# Patient Record
Sex: FEMALE | Race: BLACK OR AFRICAN AMERICAN | NOT HISPANIC OR LATINO
[De-identification: names, ages, dates, MRNs, and addresses within clinical notes are randomized per-mention and may not be internally consistent; named-entity substitution may affect disease eponyms.]

---

## 2022-06-01 PROBLEM — Z00.00 ENCOUNTER FOR PREVENTIVE HEALTH EXAMINATION: Status: ACTIVE | Noted: 2022-06-01

## 2022-06-02 ENCOUNTER — APPOINTMENT (OUTPATIENT)
Dept: SURGICAL ONCOLOGY | Facility: CLINIC | Age: 72
End: 2022-06-02
Payer: MEDICARE

## 2022-06-02 VITALS
OXYGEN SATURATION: 97 % | WEIGHT: 177 LBS | BODY MASS INDEX: 28.45 KG/M2 | HEART RATE: 55 BPM | DIASTOLIC BLOOD PRESSURE: 80 MMHG | TEMPERATURE: 98.5 F | RESPIRATION RATE: 15 BRPM | HEIGHT: 66 IN | SYSTOLIC BLOOD PRESSURE: 166 MMHG

## 2022-06-02 DIAGNOSIS — Z86.79 PERSONAL HISTORY OF OTHER DISEASES OF THE CIRCULATORY SYSTEM: ICD-10-CM

## 2022-06-02 DIAGNOSIS — Z86.39 PERSONAL HISTORY OF OTHER ENDOCRINE, NUTRITIONAL AND METABOLIC DISEASE: ICD-10-CM

## 2022-06-02 PROCEDURE — 99204 OFFICE O/P NEW MOD 45 MIN: CPT

## 2022-06-02 NOTE — HISTORY OF PRESENT ILLNESS
[de-identified] : Arti is a pleasant 72 year-old female here for an initial consultation, referred by Dr. Jaquan Ambrose. \par \par She completed a bilateral screening mammogram on 3/30/22 which revealed suggestion of spiculated masses/architectural distortion in the outer central right breast.  Subcentimeter masses are seen in the lower central and lower inner right breast 4-5 cmfn.  Questionable architectural distortion in the central left breast 6 cmfn seen on CC view.  Diagnostic mammogram/sonogram is recommended as well as biopsy of the spiculated masses/architectural distortion in the outer central right breast (BIRADS 4).\par \par Subsequent bilateral diagnostic mammogram and sonogram was performed on 5/3/22. 4 masses are seen in the right breast at 9:00, 12-14 cmfn are highly suggestive of malignancy.  Ultrasound-guided core biopsy of one of the masses is recommended (BIRADS 5). \par \par Biopsy was performed on 22.  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 Past surgical history of 1 .\par Arti denies any personal or family history of breast cancer, she does have a sister with Ovarian cancer at age 54.\par She reports daily caffeine intake with 1 cup of tea, denies any history of alcohol or tobacco use. \par \par 2022: Today she denies palpable breast masses, nipple discharge, skin changes, inversion or breast pain.\par

## 2022-06-02 NOTE — CONSULT LETTER
[Dear  ___] : Dear  [unfilled], [Consult Letter:] : I had the pleasure of evaluating your patient, [unfilled]. [Please see my note below.] : Please see my note below. [Consult Closing:] : Thank you very much for allowing me to participate in the care of this patient.  If you have any questions, please do not hesitate to contact me. [Sincerely,] : Sincerely, [FreeTextEntry2] : Jaquan Ambrose MD [FreeTextEntry3] : Aldo Sainz MD\par Surgical Oncology\par [facility]\par Office: [phone]\par Cell: [phone]\par

## 2022-06-02 NOTE — PHYSICAL EXAM
[Normal Neck Lymph Nodes] : normal neck lymph nodes  [Normal Supraclavicular Lymph Nodes] : normal supraclavicular lymph nodes [Normal Groin Lymph Nodes] : normal groin lymph nodes [Normal Axillary Lymph Nodes] : normal axillary lymph nodes [Normal] : oriented to person, place and time, with appropriate affect [FreeTextEntry1] : SS present during exam [Normal] : normal breast inspection and palpation of axillas [de-identified] : Nodularity along the right breast 9:00 location, but no discrete palpable mass.

## 2022-06-02 NOTE — ASSESSMENT
[FreeTextEntry1] : We discussed the need for staging breast MRI, and CT scans of the chest, abdomen, and pelvis.  Arti will follow-up with me upon completion of the imaging to discuss treatment options including breast conservation versus mastectomy.  She may also need a medical oncology evaluation pending the final results of her receptor studies.

## 2022-06-03 ENCOUNTER — RESULT REVIEW (OUTPATIENT)
Age: 72
End: 2022-06-03

## 2022-06-06 ENCOUNTER — APPOINTMENT (OUTPATIENT)
Dept: CT IMAGING | Facility: IMAGING CENTER | Age: 72
End: 2022-06-06
Payer: MEDICARE

## 2022-06-06 ENCOUNTER — OUTPATIENT (OUTPATIENT)
Dept: OUTPATIENT SERVICES | Facility: HOSPITAL | Age: 72
LOS: 1 days | End: 2022-06-06
Payer: COMMERCIAL

## 2022-06-06 DIAGNOSIS — C50.911 MALIGNANT NEOPLASM OF UNSPECIFIED SITE OF RIGHT FEMALE BREAST: ICD-10-CM

## 2022-06-06 PROCEDURE — 74177 CT ABD & PELVIS W/CONTRAST: CPT

## 2022-06-06 PROCEDURE — 71260 CT THORAX DX C+: CPT | Mod: 26

## 2022-06-06 PROCEDURE — 71260 CT THORAX DX C+: CPT

## 2022-06-06 PROCEDURE — 74177 CT ABD & PELVIS W/CONTRAST: CPT | Mod: 26

## 2022-06-07 ENCOUNTER — APPOINTMENT (OUTPATIENT)
Dept: MRI IMAGING | Facility: CLINIC | Age: 72
End: 2022-06-07
Payer: MEDICARE

## 2022-06-07 PROCEDURE — 77049 MRI BREAST C-+ W/CAD BI: CPT

## 2022-06-07 PROCEDURE — A9585: CPT

## 2022-06-08 ENCOUNTER — OUTPATIENT (OUTPATIENT)
Dept: OUTPATIENT SERVICES | Facility: HOSPITAL | Age: 72
LOS: 1 days | End: 2022-06-08
Payer: COMMERCIAL

## 2022-06-08 ENCOUNTER — RESULT REVIEW (OUTPATIENT)
Age: 72
End: 2022-06-08

## 2022-06-08 DIAGNOSIS — C50.911 MALIGNANT NEOPLASM OF UNSPECIFIED SITE OF RIGHT FEMALE BREAST: ICD-10-CM

## 2022-06-08 LAB — SURGICAL PATHOLOGY STUDY: SIGNIFICANT CHANGE UP

## 2022-06-08 PROCEDURE — 88321 CONSLTJ&REPRT SLD PREP ELSWR: CPT

## 2022-06-13 ENCOUNTER — NON-APPOINTMENT (OUTPATIENT)
Age: 72
End: 2022-06-13

## 2022-06-16 ENCOUNTER — NON-APPOINTMENT (OUTPATIENT)
Age: 72
End: 2022-06-16

## 2022-06-16 ENCOUNTER — APPOINTMENT (OUTPATIENT)
Dept: SURGICAL ONCOLOGY | Facility: CLINIC | Age: 72
End: 2022-06-16
Payer: MEDICARE

## 2022-06-16 VITALS
RESPIRATION RATE: 16 BRPM | HEART RATE: 53 BPM | DIASTOLIC BLOOD PRESSURE: 73 MMHG | HEIGHT: 66 IN | SYSTOLIC BLOOD PRESSURE: 156 MMHG | OXYGEN SATURATION: 99 % | TEMPERATURE: 97.2 F | WEIGHT: 179 LBS | BODY MASS INDEX: 28.77 KG/M2

## 2022-06-16 PROCEDURE — 99214 OFFICE O/P EST MOD 30 MIN: CPT

## 2022-06-16 NOTE — PHYSICAL EXAM
[Normal] : normal breast inspection and palpation of axillas [Normal Neck Lymph Nodes] : normal neck lymph nodes  [Normal Supraclavicular Lymph Nodes] : normal supraclavicular lymph nodes [Normal Groin Lymph Nodes] : normal groin lymph nodes [Normal Axillary Lymph Nodes] : normal axillary lymph nodes [Normal] : oriented to person, place and time, with appropriate affect [FreeTextEntry1] : SS present during exam [de-identified] : Nodularity along the right breast 9:00 location, but no discrete palpable mass.

## 2022-06-16 NOTE — CONSULT LETTER
[Dear  ___] : Dear  [unfilled], [Consult Letter:] : I had the pleasure of evaluating your patient, [unfilled]. [Please see my note below.] : Please see my note below. [Consult Closing:] : Thank you very much for allowing me to participate in the care of this patient.  If you have any questions, please do not hesitate to contact me. [Sincerely,] : Sincerely, [FreeTextEntry2] : Jaquan Ambrose MD [FreeTextEntry3] : Aldo Sainz MD\par Surgical Oncology\par [facility]\par Office: [phone]\par Cell: [phone]\par

## 2022-06-16 NOTE — HISTORY OF PRESENT ILLNESS
[de-identified] : Arti is a pleasant 72 year-old female here for a follow-up, referred by Dr. Jaquan Ambrose. \par \par She completed a bilateral screening mammogram on 3/30/22 which revealed suggestion of spiculated masses/architectural distortion in the outer central right breast.  Subcentimeter masses are seen in the lower central and lower inner right breast 4-5 cmfn.  Questionable architectural distortion in the central left breast 6 cmfn seen on CC view.  Diagnostic mammogram/sonogram is recommended as well as biopsy of the spiculated masses/architectural distortion in the outer central right breast (BIRADS 4).\par \par Subsequent bilateral diagnostic mammogram and sonogram was performed on 5/3/22. 4 masses are seen in the right breast at 9:00, 12-14 cmfn are highly suggestive of malignancy.  Ultrasound-guided core biopsy of one of the masses is recommended (BIRADS 5). \par \par Biopsy was performed on 22.  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 Past surgical history of 1 .\par Arti denies any personal or family history of breast cancer, she does have a sister with Ovarian cancer at age 54.\par She reports daily caffeine intake with 1 cup of tea, denies any history of alcohol or tobacco use. \par \par 2022: Today she denies palpable breast masses, nipple discharge, skin changes, inversion or breast pain. We discussed the need for staging breast MRI, and CT scans of the chest, abdomen, and pelvis.  Arti will follow-up with me upon completion of the imaging to discuss treatment options including breast conservation versus mastectomy.  She may also need a medical oncology evaluation pending the final results of her receptor studies.\par \par [facility] pathology of biopsy slides: moderately differentiated invasive lobular carcinoma, (ER-/TX-/HER2-)\par \par CT C/A/P from 2022: Multiple small retroperitoneal lymph nodes, the largest of which are considered mildly enlarged. This is nonspecific and may be chronic or reactive. Metastatic disease from breast carcinoma would be considered much less likely although clinical correlation is recommended. PET/CT may be of value if clinically warranted.\par \par Bilateral MR of breasts on 2022: Conglomerate grouping of small irregular masses in the posterior lower outer right breast spanning 3.3 cm, corresponding to a site of biopsy-proven malignancy with contiguous enhancing foci and branch nonmass enhancement extending an additional 5.5 cm anterior to the conglomerate masses, concerning for additional disease. If breast conservation is considered, MRI guided biopsy of a more anterior aspect of this nonmass enhancement is suggested.\par \par 0.7 cm ovoid enhancing mass in the lower outer right breast anterior depth, approximately 3.5 cm posterior to the nipple which is indeterminate. If breast conservation is considered, MRI guided biopsy is recommended.\par No suspicious enhancing findings in the left breast.\par No lymphadenopathy.\par \par 2022: Spoke with Arti on the phone regarding her MRI & CT results, will discuss further in office. She will also need [facility]. \par \par 2022: Arti returns today to discuss her recent imaging in details and discuss next steps.

## 2022-06-16 NOTE — ASSESSMENT
[FreeTextEntry1] : We reviewed Arti's MRI and CT findings.  We also had additional discussions regarding breast conservation and mastectomy.  Arti is still undecided about breast conservation.  I will arrange for a MRI guided biopsy of the right breast.  She will also undergo a pelvic ultrasound and a PET/CT.  We also discussed the need for medical oncology evaluation of the potential for neoadjuvant systemic therapy.

## 2022-06-20 ENCOUNTER — OUTPATIENT (OUTPATIENT)
Dept: OUTPATIENT SERVICES | Facility: HOSPITAL | Age: 72
LOS: 1 days | Discharge: HOME | End: 2022-06-20

## 2022-06-20 DIAGNOSIS — C50.919 MALIGNANT NEOPLASM OF UNSPECIFIED SITE OF UNSPECIFIED FEMALE BREAST: ICD-10-CM

## 2022-06-21 ENCOUNTER — NON-APPOINTMENT (OUTPATIENT)
Age: 72
End: 2022-06-21

## 2022-06-22 ENCOUNTER — RESULT REVIEW (OUTPATIENT)
Age: 72
End: 2022-06-22

## 2022-06-22 ENCOUNTER — OUTPATIENT (OUTPATIENT)
Dept: OUTPATIENT SERVICES | Facility: HOSPITAL | Age: 72
LOS: 1 days | End: 2022-06-22
Payer: COMMERCIAL

## 2022-06-22 ENCOUNTER — APPOINTMENT (OUTPATIENT)
Dept: MRI IMAGING | Facility: IMAGING CENTER | Age: 72
End: 2022-06-22
Payer: MEDICARE

## 2022-06-22 DIAGNOSIS — Z00.8 ENCOUNTER FOR OTHER GENERAL EXAMINATION: ICD-10-CM

## 2022-06-22 PROCEDURE — 19086 BX BREAST ADD LESION MR IMAG: CPT | Mod: RT

## 2022-06-22 PROCEDURE — 19085 BX BREAST 1ST LESION MR IMAG: CPT | Mod: RT

## 2022-06-22 PROCEDURE — 88341 IMHCHEM/IMCYTCHM EA ADD ANTB: CPT | Mod: 26

## 2022-06-22 PROCEDURE — 88342 IMHCHEM/IMCYTCHM 1ST ANTB: CPT

## 2022-06-22 PROCEDURE — 88377 M/PHMTRC ALYS ISHQUANT/SEMIQ: CPT

## 2022-06-22 PROCEDURE — 88360 TUMOR IMMUNOHISTOCHEM/MANUAL: CPT

## 2022-06-22 PROCEDURE — A4648: CPT

## 2022-06-22 PROCEDURE — 19085 BX BREAST 1ST LESION MR IMAG: CPT

## 2022-06-22 PROCEDURE — 19086 BX BREAST ADD LESION MR IMAG: CPT

## 2022-06-22 PROCEDURE — 88305 TISSUE EXAM BY PATHOLOGIST: CPT | Mod: 26

## 2022-06-22 PROCEDURE — 88341 IMHCHEM/IMCYTCHM EA ADD ANTB: CPT

## 2022-06-22 PROCEDURE — 88377 M/PHMTRC ALYS ISHQUANT/SEMIQ: CPT | Mod: 26

## 2022-06-22 PROCEDURE — 77065 DX MAMMO INCL CAD UNI: CPT | Mod: 26,RT

## 2022-06-22 PROCEDURE — A9585: CPT

## 2022-06-22 PROCEDURE — 88342 IMHCHEM/IMCYTCHM 1ST ANTB: CPT | Mod: 26

## 2022-06-22 PROCEDURE — 88360 TUMOR IMMUNOHISTOCHEM/MANUAL: CPT | Mod: 26,59

## 2022-06-22 PROCEDURE — 88305 TISSUE EXAM BY PATHOLOGIST: CPT

## 2022-06-22 PROCEDURE — 77065 DX MAMMO INCL CAD UNI: CPT

## 2022-06-23 ENCOUNTER — APPOINTMENT (OUTPATIENT)
Age: 72
End: 2022-06-23
Payer: MEDICARE

## 2022-06-23 ENCOUNTER — RESULT REVIEW (OUTPATIENT)
Age: 72
End: 2022-06-23

## 2022-06-23 DIAGNOSIS — Z78.9 OTHER SPECIFIED HEALTH STATUS: ICD-10-CM

## 2022-06-23 DIAGNOSIS — Z80.41 FAMILY HISTORY OF MALIGNANT NEOPLASM OF OVARY: ICD-10-CM

## 2022-06-23 LAB
APTT BLD: 31.1 SEC
BASOPHILS # BLD AUTO: 0.02 K/UL — SIGNIFICANT CHANGE UP (ref 0–0.2)
BASOPHILS NFR BLD AUTO: 0.4 % — SIGNIFICANT CHANGE UP (ref 0–2)
EOSINOPHIL # BLD AUTO: 0.06 K/UL — SIGNIFICANT CHANGE UP (ref 0–0.5)
EOSINOPHIL NFR BLD AUTO: 1.3 % — SIGNIFICANT CHANGE UP (ref 0–6)
HBV CORE IGG+IGM SER QL: NONREACTIVE
HBV SURFACE AB SER QL: NONREACTIVE
HBV SURFACE AG SER QL: NONREACTIVE
HCT VFR BLD CALC: 35.7 % — SIGNIFICANT CHANGE UP (ref 34.5–45)
HGB BLD-MCNC: 11.4 G/DL — LOW (ref 11.5–15.5)
IMM GRANULOCYTES NFR BLD AUTO: 0.4 % — SIGNIFICANT CHANGE UP (ref 0–1.5)
INR PPP: 1.11 RATIO
LYMPHOCYTES # BLD AUTO: 1.73 K/UL — SIGNIFICANT CHANGE UP (ref 1–3.3)
LYMPHOCYTES # BLD AUTO: 37.6 % — SIGNIFICANT CHANGE UP (ref 13–44)
MCHC RBC-ENTMCNC: 30.5 PG — SIGNIFICANT CHANGE UP (ref 27–34)
MCHC RBC-ENTMCNC: 31.9 G/DL — LOW (ref 32–36)
MCV RBC AUTO: 95.5 FL — SIGNIFICANT CHANGE UP (ref 80–100)
MONOCYTES # BLD AUTO: 0.54 K/UL — SIGNIFICANT CHANGE UP (ref 0–0.9)
MONOCYTES NFR BLD AUTO: 11.7 % — SIGNIFICANT CHANGE UP (ref 2–14)
NEUTROPHILS # BLD AUTO: 2.23 K/UL — SIGNIFICANT CHANGE UP (ref 1.8–7.4)
NEUTROPHILS NFR BLD AUTO: 48.6 % — SIGNIFICANT CHANGE UP (ref 43–77)
NRBC # BLD: 0 /100 WBCS — SIGNIFICANT CHANGE UP (ref 0–0)
PLATELET # BLD AUTO: 163 K/UL — SIGNIFICANT CHANGE UP (ref 150–400)
PT BLD: 12.9 SEC
RBC # BLD: 3.74 M/UL — LOW (ref 3.8–5.2)
RBC # FLD: 14.4 % — SIGNIFICANT CHANGE UP (ref 10.3–14.5)
WBC # BLD: 4.6 K/UL — SIGNIFICANT CHANGE UP (ref 3.8–10.5)
WBC # FLD AUTO: 4.6 K/UL — SIGNIFICANT CHANGE UP (ref 3.8–10.5)

## 2022-06-23 PROCEDURE — 99205 OFFICE O/P NEW HI 60 MIN: CPT

## 2022-06-23 RX ORDER — LEVOTHYROXINE SODIUM 0.09 MG/1
88 TABLET ORAL
Qty: 90 | Refills: 0 | Status: ACTIVE | COMMUNITY
Start: 2022-01-21

## 2022-06-23 RX ORDER — FLUTICASONE PROPIONATE 50 UG/1
50 SPRAY, METERED NASAL
Qty: 48 | Refills: 0 | Status: ACTIVE | COMMUNITY
Start: 2022-05-13

## 2022-06-23 RX ORDER — OLMESARTAN MEDOXOMIL 20 MG/1
20 TABLET, FILM COATED ORAL
Qty: 90 | Refills: 0 | Status: ACTIVE | COMMUNITY
Start: 2022-01-21

## 2022-06-24 LAB — CANCER AG27-29 SERPL-ACNC: 11.8 U/ML

## 2022-06-25 LAB
25(OH)D3 SERPL-MCNC: 40.6 NG/ML
ALBUMIN SERPL ELPH-MCNC: 4.5 G/DL
ALP BLD-CCNC: 87 U/L
ALT SERPL-CCNC: 14 U/L
ANION GAP SERPL CALC-SCNC: 21 MMOL/L
AST SERPL-CCNC: 21 U/L
BILIRUB SERPL-MCNC: 0.4 MG/DL
BUN SERPL-MCNC: 20 MG/DL
CALCIUM SERPL-MCNC: 10.2 MG/DL
CHLORIDE SERPL-SCNC: 102 MMOL/L
CO2 SERPL-SCNC: 18 MMOL/L
CREAT SERPL-MCNC: 1.54 MG/DL
EGFR: 35 ML/MIN/1.73M2
GLUCOSE SERPL-MCNC: 92 MG/DL
POTASSIUM SERPL-SCNC: 4.4 MMOL/L
PROT SERPL-MCNC: 8.5 G/DL
SODIUM SERPL-SCNC: 141 MMOL/L
TSH SERPL-ACNC: 6.01 UIU/ML

## 2022-07-02 ENCOUNTER — OUTPATIENT (OUTPATIENT)
Dept: OUTPATIENT SERVICES | Facility: HOSPITAL | Age: 72
LOS: 1 days | End: 2022-07-02
Payer: COMMERCIAL

## 2022-07-02 DIAGNOSIS — Z11.52 ENCOUNTER FOR SCREENING FOR COVID-19: ICD-10-CM

## 2022-07-02 LAB — SARS-COV-2 RNA SPEC QL NAA+PROBE: SIGNIFICANT CHANGE UP

## 2022-07-02 PROCEDURE — U0005: CPT

## 2022-07-02 PROCEDURE — C9803: CPT

## 2022-07-02 PROCEDURE — U0003: CPT

## 2022-07-05 ENCOUNTER — APPOINTMENT (OUTPATIENT)
Dept: NUCLEAR MEDICINE | Facility: IMAGING CENTER | Age: 72
End: 2022-07-05

## 2022-07-05 ENCOUNTER — OUTPATIENT (OUTPATIENT)
Dept: OUTPATIENT SERVICES | Facility: HOSPITAL | Age: 72
LOS: 1 days | End: 2022-07-05
Payer: COMMERCIAL

## 2022-07-05 DIAGNOSIS — C50.911 MALIGNANT NEOPLASM OF UNSPECIFIED SITE OF RIGHT FEMALE BREAST: ICD-10-CM

## 2022-07-05 DIAGNOSIS — Z00.8 ENCOUNTER FOR OTHER GENERAL EXAMINATION: ICD-10-CM

## 2022-07-05 PROCEDURE — A9552: CPT

## 2022-07-05 PROCEDURE — 78815 PET IMAGE W/CT SKULL-THIGH: CPT | Mod: 26,PI

## 2022-07-05 PROCEDURE — 78815 PET IMAGE W/CT SKULL-THIGH: CPT

## 2022-07-07 ENCOUNTER — APPOINTMENT (OUTPATIENT)
Age: 72
End: 2022-07-07

## 2022-07-07 VITALS
HEIGHT: 65.35 IN | OXYGEN SATURATION: 99 % | BODY MASS INDEX: 29.39 KG/M2 | HEART RATE: 86 BPM | SYSTOLIC BLOOD PRESSURE: 165 MMHG | DIASTOLIC BLOOD PRESSURE: 74 MMHG | WEIGHT: 178.57 LBS | TEMPERATURE: 98.1 F | RESPIRATION RATE: 16 BRPM

## 2022-07-07 DIAGNOSIS — I10 ESSENTIAL (PRIMARY) HYPERTENSION: ICD-10-CM

## 2022-07-07 DIAGNOSIS — E03.9 HYPOTHYROIDISM, UNSPECIFIED: ICD-10-CM

## 2022-07-07 PROCEDURE — 93010 ELECTROCARDIOGRAM REPORT: CPT

## 2022-07-07 PROCEDURE — 93000 ELECTROCARDIOGRAM COMPLETE: CPT

## 2022-07-07 PROCEDURE — 99214 OFFICE O/P EST MOD 30 MIN: CPT

## 2022-07-08 PROBLEM — E03.9 HYPOTHYROIDISM: Status: ACTIVE | Noted: 2022-07-08

## 2022-07-08 PROBLEM — I10 HTN (HYPERTENSION): Status: ACTIVE | Noted: 2022-07-08

## 2022-07-11 ENCOUNTER — OUTPATIENT (OUTPATIENT)
Dept: OUTPATIENT SERVICES | Facility: HOSPITAL | Age: 72
LOS: 1 days | End: 2022-07-11
Payer: COMMERCIAL

## 2022-07-11 DIAGNOSIS — Z11.52 ENCOUNTER FOR SCREENING FOR COVID-19: ICD-10-CM

## 2022-07-11 LAB — SARS-COV-2 RNA SPEC QL NAA+PROBE: SIGNIFICANT CHANGE UP

## 2022-07-11 PROCEDURE — C9803: CPT

## 2022-07-11 PROCEDURE — U0003: CPT

## 2022-07-11 PROCEDURE — U0005: CPT

## 2022-07-12 ENCOUNTER — NON-APPOINTMENT (OUTPATIENT)
Age: 72
End: 2022-07-12

## 2022-07-13 ENCOUNTER — NON-APPOINTMENT (OUTPATIENT)
Age: 72
End: 2022-07-13

## 2022-07-14 ENCOUNTER — RESULT REVIEW (OUTPATIENT)
Age: 72
End: 2022-07-14

## 2022-07-14 ENCOUNTER — APPOINTMENT (OUTPATIENT)
Dept: ULTRASOUND IMAGING | Facility: IMAGING CENTER | Age: 72
End: 2022-07-14

## 2022-07-14 ENCOUNTER — OUTPATIENT (OUTPATIENT)
Dept: OUTPATIENT SERVICES | Facility: HOSPITAL | Age: 72
LOS: 1 days | End: 2022-07-14
Payer: COMMERCIAL

## 2022-07-14 DIAGNOSIS — Z00.8 ENCOUNTER FOR OTHER GENERAL EXAMINATION: ICD-10-CM

## 2022-07-14 DIAGNOSIS — C50.911 MALIGNANT NEOPLASM OF UNSPECIFIED SITE OF RIGHT FEMALE BREAST: ICD-10-CM

## 2022-07-14 PROCEDURE — 76856 US EXAM PELVIC COMPLETE: CPT | Mod: 26

## 2022-07-14 PROCEDURE — 76830 TRANSVAGINAL US NON-OB: CPT | Mod: 26

## 2022-07-14 PROCEDURE — 76856 US EXAM PELVIC COMPLETE: CPT

## 2022-07-14 PROCEDURE — 76830 TRANSVAGINAL US NON-OB: CPT

## 2022-07-15 ENCOUNTER — TRANSCRIPTION ENCOUNTER (OUTPATIENT)
Age: 72
End: 2022-07-15

## 2022-07-15 ENCOUNTER — RESULT REVIEW (OUTPATIENT)
Age: 72
End: 2022-07-15

## 2022-07-15 ENCOUNTER — OUTPATIENT (OUTPATIENT)
Dept: OUTPATIENT SERVICES | Facility: HOSPITAL | Age: 72
LOS: 1 days | End: 2022-07-15
Payer: COMMERCIAL

## 2022-07-15 ENCOUNTER — NON-APPOINTMENT (OUTPATIENT)
Age: 72
End: 2022-07-15

## 2022-07-15 VITALS
OXYGEN SATURATION: 99 % | SYSTOLIC BLOOD PRESSURE: 170 MMHG | WEIGHT: 177.91 LBS | RESPIRATION RATE: 20 BRPM | DIASTOLIC BLOOD PRESSURE: 91 MMHG | HEIGHT: 66 IN | HEART RATE: 56 BPM | TEMPERATURE: 98 F

## 2022-07-15 VITALS
HEART RATE: 80 BPM | DIASTOLIC BLOOD PRESSURE: 59 MMHG | RESPIRATION RATE: 21 BRPM | OXYGEN SATURATION: 100 % | SYSTOLIC BLOOD PRESSURE: 110 MMHG

## 2022-07-15 DIAGNOSIS — C50.911 MALIGNANT NEOPLASM OF UNSPECIFIED SITE OF RIGHT FEMALE BREAST: ICD-10-CM

## 2022-07-15 PROCEDURE — 77001 FLUOROGUIDE FOR VEIN DEVICE: CPT | Mod: 26

## 2022-07-15 PROCEDURE — 36561 INSERT TUNNELED CV CATH: CPT | Mod: LT

## 2022-07-15 PROCEDURE — 76937 US GUIDE VASCULAR ACCESS: CPT | Mod: 26

## 2022-07-15 NOTE — ASU DISCHARGE PLAN (ADULT/PEDIATRIC) - ASU DC SPECIAL INSTRUCTIONSFT
Chest Port Placement    Discharge Instructions  - You have had a chest port implanted in your chest.   - The port is ready for use.  - You may shower in 48 hours. No soaking or swimming for 2 weeks or until the site is completely healed.  - Keep the area covered and dry for the next 7 days. It may be removed by a chemotherapy nurse as needed for treatment.  - Do not perform any heavy lifting or put tension on the area for the next week or until the site is healed.  - You may resume your normal diet.  - You may resume your normal medications however you should wait 48 hours before restarting aspirin, Plavix, or blood thinners.  - It is normal to experience some pain over the site for the next few days. You may take apply ice to the area (20 minutes on, 20 minutes off) and take Tylenol for that pain. Do not take more frequently than every 6 hours and do not exceed more than 3000mg of Tylenol in a 24 hour period.    - You were given conscious sedation which may make you drowsy, therefore you need someone to stay with you until the morning following the procedure.  - Do not drive, engage in heavy lifting or strenuous activity, or drink any alcoholic beverages for the next 24 hours.   - You may resume normal activity in 24 hours.    Notify your primary physician and/or Interventional Radiology IMMEDIATELY if you experience any of the following       - Fever of 100.4F or 38C       - Chills or Rigors/ Shakes       - Swelling and/or Redness in the area around the port       - Worsening Pain       - Blood soaked bandages or worsening bleeding       - Lightheadedness and/or dizziness upon standing       - Chest Pain/ Tightness       - Shortness of Breath       - Difficulty walking    If you have a problem that you believe requires IMMEDIATE attention, please go to your NEAREST Emergency Room. If you believe your problem can safely wait until you speak to a physician, please call Interventional Radiology for any concerns.    During Normal Weekday Business Hours- You can contact the Interventional Radiology department during normal business hours via telephone.  During Evenings and Weekends- If you need to contact Interventional Radiology during off hours, do so by calling the hospital and requesting to be connected to the Interventional Radiologist on call.

## 2022-07-15 NOTE — PRE PROCEDURE NOTE - PRE PROCEDURE EVALUATION
Interventional Radiology    HPI: 72y Female with newly diagnosed right side breast cancer requiring venous access for chemotherapy presents to IR for left chest wall port placement.    Allergies: NKDA    Data:    81  T(C): --  HR: --  BP: --  RR: --  SpO2: --    Exam  General: No acute distress  Chest: Non labored breathing  Abdomen: Non-distended  Extremities: No swelling, warm      Plan: 72y Female presents for chest port placement  -Risks/Benefits/alternatives explained with the patient and witnessed informed consent obtained.

## 2022-07-15 NOTE — ASU DISCHARGE PLAN (ADULT/PEDIATRIC) - NS MD DC FALL RISK RISK
For information on Fall & Injury Prevention, visit: [email or web address] OR  [email or web address] OR  https://www.cdc.gov/steadi/patient.html

## 2022-07-18 PROBLEM — E03.9 HYPOTHYROIDISM, UNSPECIFIED: Chronic | Status: ACTIVE | Noted: 2022-07-15

## 2022-07-18 PROBLEM — I10 ESSENTIAL (PRIMARY) HYPERTENSION: Chronic | Status: ACTIVE | Noted: 2022-07-15

## 2022-07-18 PROBLEM — C50.911 MALIGNANT NEOPLASM OF UNSPECIFIED SITE OF RIGHT FEMALE BREAST: Chronic | Status: ACTIVE | Noted: 2022-07-15

## 2022-07-19 ENCOUNTER — OUTPATIENT (OUTPATIENT)
Dept: OUTPATIENT SERVICES | Facility: HOSPITAL | Age: 72
LOS: 1 days | End: 2022-07-19

## 2022-07-19 ENCOUNTER — APPOINTMENT (OUTPATIENT)
Dept: CV DIAGNOSITCS | Facility: HOSPITAL | Age: 72
End: 2022-07-19

## 2022-07-19 DIAGNOSIS — C50.911 MALIGNANT NEOPLASM OF UNSPECIFIED SITE OF RIGHT FEMALE BREAST: ICD-10-CM

## 2022-07-19 PROCEDURE — 93306 TTE W/DOPPLER COMPLETE: CPT | Mod: 26

## 2022-07-19 NOTE — ASSESSMENT
[FreeTextEntry1] : Ms. BALDERRAMA 's questions were answered to her satisfaction. \par She  expressed her  understanding and willingness to comply with the above recommendations, and  will return to the office in .\par \par

## 2022-07-19 NOTE — CONSULT LETTER
[Dear  ___] : Dear ~MI, [Consult Letter:] : I had the pleasure of evaluating your patient, [unfilled]. [Please see my note below.] : Please see my note below. [Consult Closing:] : Thank you very much for allowing me to participate in the care of this patient.  If you have any questions, please do not hesitate to contact me. [Sincerely,] : Sincerely, [DrMagda  ___] : Dr. CARDOSO [FreeTextEntry2] : Aldo Sainz MD [FreeTextEntry3] : VIMAL IBARRA M.D.\par Medical/ Oncology\par [facility] \par [facility]\par [address]\par [place], [place] [address]\par Telephone: [phone]\par Fax: [phone]\par \par \par \par \par \par

## 2022-07-19 NOTE — HISTORY OF PRESENT ILLNESS
[Disease: _____________________] : Disease: [unfilled] [de-identified] : 72F,originally from [place], with PMX HTN, hypothyroidism, s/p , referred for medical oncology consultation of right breast, ER/[place]/HER2 negative mixed poorly differentiated ductal/lobular carcinoma.\par \par CASE SYNOPSIS:\par 3/30/22: Bilateral screening mammogram–spiculated masses/architectural distortion in the outer central right breast.  Subcentimeter masses seen in the lower central and lower inner right breast N4–5 cm.  Questionable architectural distortion in the central left breast, N 6 cm seen on cc view.\par 5/3/2022 diagnostic mammography and breast ultrasound; for masses seen in the right breast at 9:00, N 12-14 cm, highly suggestive of malignancy, for which US guided core biopsy recommended.\par 2022–US core biopsy right breast mass; pathology consistent with invasive, mixed poorly differentiated ductal/lobular carcinoma, ER, [place], HER2 negative.\par 2022: CT C/A/P–multiple, small retroperitoneal lymph nodes, considered mildly enlarged, nonspecific, chronic/reactive, less likely metastatic disease from breast cancer.\par 2022: Bilateral breast MRI: Conglomerate grouping of small irregular masses in the posterior lower outer right breast spanning 3.3 cm corresponding to the site of biopsy-proven malignancy with contiguous enhancing foci and branched non-mass enhancement extending an additional 5.5 cm anterior to the conglomerate masses, concerning for additional disease.  MRI guided biopsy of a more anterior aspect of this none mass enhancement is suggested if breast conservation is considered.\par 0.7 cm ovoid enhancing mass in the lower outer right breast anterior depth, approximately 3.5 cm posterior to the nipple, indeterminate.  MRI guided biopsy recommended\par No suspicious enhancing findings in the left breast.  No lymphadenopathy.\par \par Patient is pending an MRI guided biopsy of the right breast, a pelvic ultrasound and a PET/CT to further identify the stage of disease.  Overall patient denies constitutional symptoms.  Gives a family history of ovarian cancer (sister, diagnosed age 54), but denies history of breast cancers in the family. [de-identified] : Moderately differentiated invasive lobular carcinoma [de-identified] : ER negative, CO negative, HER2 negative [FreeTextEntry1] : Neoadjuvant systemic chemotherapy

## 2022-07-19 NOTE — HISTORY OF PRESENT ILLNESS
[de-identified] : 72F,originally from [place], with PMX HTN, hypothyroidism, s/p , referred for medical oncology consultation of right breast, ER/[place]/HER2 negative mixed poorly differentiated ductal/lobular carcinoma.\par \par CASE SYNOPSIS:\par 3/30/22: Bilateral screening mammogram–spiculated masses/architectural distortion in the outer central right breast.  Subcentimeter masses seen in the lower central and lower inner right breast N4–5 cm.  Questionable architectural distortion in the central left breast, N 6 cm seen on cc view.\par \par 5/3/2022 diagnostic mammography and breast ultrasound; for masses seen in the right breast at 9:00, N 12-14 cm, highly suggestive of malignancy, for which US guided core biopsy recommended.\par \par 2022–US core biopsy right breast mass; pathology consistent with invasive, mixed poorly differentiated ductal/lobular carcinoma, ER, [place], HER2 negative.\par \par 2022: CT C/A/P–multiple, small retroperitoneal lymph nodes, considered mildly enlarged, nonspecific, chronic/reactive, less likely metastatic disease from breast cancer.\par \par 2022: Bilateral breast MRI: Conglomerate grouping of small irregular masses in the posterior lower outer right breast spanning 3.3 cm corresponding to the site of biopsy-proven malignancy with contiguous enhancing foci and branched non-mass enhancement extending an additional 5.5 cm anterior to the conglomerate masses, concerning for additional disease.  MRI guided biopsy of a more anterior aspect of this none mass enhancement is suggested if breast conservation is considered.\par 0.7 cm ovoid enhancing mass in the lower outer right breast anterior depth, approximately 3.5 cm posterior to the nipple, indeterminate.  MRI guided biopsy recommended\par No suspicious enhancing findings in the left breast.  No lymphadenopathy.\par \par 2022–MRI guided vacuum-assisted core needle biopsy; pathology:\par RIGHT\par Site A (outer lower region):\par Invasive lobular carcinoma, classic type, [place] score 6/9, invasive tumor present in several small foci, all measuring < 0.1 cm, LCIS classic type,\par Site B (anterior outer lower region):\par Intraductal papilloma with adjacent foci of columnar cell change, apocrine metaplasia, microcyst formation and simple adenosis.\par \par 2022: PET scan–preliminary report negative\par  [de-identified] : Moderately differentiated invasive lobular carcinoma [de-identified] : ER negative, CT negative, HER2 negative [FreeTextEntry1] : Neoadjuvant systemic chemotherapy [de-identified] : Patient stable clinically.  Had right breast MRI guided vacuum-assisted core needle biopsy on 6/22/2022, confirming invasive lobular carcinoma ( site A) and intraductal papilloma ( site B).  PET scan preliminary results showed no evidence of distant disease.  No changes in constitutional symptoms or medication.  Appetite and weight preserved.  Recent blood work consistent with mild CKD (EGFR 35 mL/min, creatinine 1.54 mg/dL), negative hepatitis profile, normal coagulation parameters, elevated TSH.  Pending [id number] placement on 7/14/2022, together with the pelvic US.\par

## 2022-07-19 NOTE — PHYSICAL EXAM
[de-identified] : normal breast inspection and palpation of axillas . Nodularity along the right breast 9:00 location, but no discrete palpable mass.

## 2022-07-19 NOTE — REASON FOR VISIT
[Initial Consultation] : an initial consultation [FreeTextEntry2] : Triple negative right breast cancer

## 2022-07-19 NOTE — ASSESSMENT
[FreeTextEntry1] : Ms. BALDERRAMA 's questions were answered to her satisfaction. \par She  expressed her  understanding and willingness to comply with the above recommendations, and  will return to the office in 2 weeks.\par \par

## 2022-07-21 ENCOUNTER — RESULT REVIEW (OUTPATIENT)
Age: 72
End: 2022-07-21

## 2022-07-21 ENCOUNTER — APPOINTMENT (OUTPATIENT)
Dept: HEMATOLOGY ONCOLOGY | Facility: CLINIC | Age: 72
End: 2022-07-21

## 2022-07-21 ENCOUNTER — NON-APPOINTMENT (OUTPATIENT)
Age: 72
End: 2022-07-21

## 2022-07-21 ENCOUNTER — LABORATORY RESULT (OUTPATIENT)
Age: 72
End: 2022-07-21

## 2022-07-21 ENCOUNTER — APPOINTMENT (OUTPATIENT)
Age: 72
End: 2022-07-21

## 2022-07-21 LAB
BASOPHILS # BLD AUTO: 0.03 K/UL — SIGNIFICANT CHANGE UP (ref 0–0.2)
BASOPHILS NFR BLD AUTO: 0.6 % — SIGNIFICANT CHANGE UP (ref 0–2)
EOSINOPHIL # BLD AUTO: 0.08 K/UL — SIGNIFICANT CHANGE UP (ref 0–0.5)
EOSINOPHIL NFR BLD AUTO: 1.7 % — SIGNIFICANT CHANGE UP (ref 0–6)
HCT VFR BLD CALC: 32.8 % — LOW (ref 34.5–45)
HGB BLD-MCNC: 11 G/DL — LOW (ref 11.5–15.5)
IMM GRANULOCYTES NFR BLD AUTO: 0.4 % — SIGNIFICANT CHANGE UP (ref 0–1.5)
LYMPHOCYTES # BLD AUTO: 1.56 K/UL — SIGNIFICANT CHANGE UP (ref 1–3.3)
LYMPHOCYTES # BLD AUTO: 32.2 % — SIGNIFICANT CHANGE UP (ref 13–44)
MCHC RBC-ENTMCNC: 30.7 PG — SIGNIFICANT CHANGE UP (ref 27–34)
MCHC RBC-ENTMCNC: 33.5 G/DL — SIGNIFICANT CHANGE UP (ref 32–36)
MCV RBC AUTO: 91.6 FL — SIGNIFICANT CHANGE UP (ref 80–100)
MONOCYTES # BLD AUTO: 0.58 K/UL — SIGNIFICANT CHANGE UP (ref 0–0.9)
MONOCYTES NFR BLD AUTO: 12 % — SIGNIFICANT CHANGE UP (ref 2–14)
NEUTROPHILS # BLD AUTO: 2.57 K/UL — SIGNIFICANT CHANGE UP (ref 1.8–7.4)
NEUTROPHILS NFR BLD AUTO: 53.1 % — SIGNIFICANT CHANGE UP (ref 43–77)
NRBC # BLD: 0 /100 WBCS — SIGNIFICANT CHANGE UP (ref 0–0)
PLATELET # BLD AUTO: 160 K/UL — SIGNIFICANT CHANGE UP (ref 150–400)
RBC # BLD: 3.58 M/UL — LOW (ref 3.8–5.2)
RBC # FLD: 13.4 % — SIGNIFICANT CHANGE UP (ref 10.3–14.5)
WBC # BLD: 4.84 K/UL — SIGNIFICANT CHANGE UP (ref 3.8–10.5)
WBC # FLD AUTO: 4.84 K/UL — SIGNIFICANT CHANGE UP (ref 3.8–10.5)

## 2022-07-22 DIAGNOSIS — C50.911 MALIGNANT NEOPLASM OF UNSPECIFIED SITE OF RIGHT FEMALE BREAST: ICD-10-CM

## 2022-07-22 DIAGNOSIS — R11.2 NAUSEA WITH VOMITING, UNSPECIFIED: ICD-10-CM

## 2022-07-22 DIAGNOSIS — Z51.11 ENCOUNTER FOR ANTINEOPLASTIC CHEMOTHERAPY: ICD-10-CM

## 2022-07-22 DIAGNOSIS — Z45.2 ENCOUNTER FOR ADJUSTMENT AND MANAGEMENT OF VASCULAR ACCESS DEVICE: ICD-10-CM

## 2022-07-22 DIAGNOSIS — Z51.89 ENCOUNTER FOR OTHER SPECIFIED AFTERCARE: ICD-10-CM

## 2022-07-26 ENCOUNTER — NON-APPOINTMENT (OUTPATIENT)
Age: 72
End: 2022-07-26

## 2022-08-11 ENCOUNTER — RESULT REVIEW (OUTPATIENT)
Age: 72
End: 2022-08-11

## 2022-08-11 ENCOUNTER — APPOINTMENT (OUTPATIENT)
Age: 72
End: 2022-08-11

## 2022-08-11 LAB
ALBUMIN SERPL ELPH-MCNC: 4 G/DL — SIGNIFICANT CHANGE UP (ref 3.3–5)
ALP SERPL-CCNC: 69 U/L — SIGNIFICANT CHANGE UP (ref 40–120)
ALT FLD-CCNC: 15 U/L — SIGNIFICANT CHANGE UP (ref 10–45)
ANION GAP SERPL CALC-SCNC: 12 MMOL/L — SIGNIFICANT CHANGE UP (ref 5–17)
AST SERPL-CCNC: 18 U/L — SIGNIFICANT CHANGE UP (ref 10–40)
BASOPHILS # BLD AUTO: 0 K/UL — SIGNIFICANT CHANGE UP (ref 0–0.2)
BASOPHILS NFR BLD AUTO: 0 % — SIGNIFICANT CHANGE UP (ref 0–2)
BILIRUB SERPL-MCNC: 0.2 MG/DL — SIGNIFICANT CHANGE UP (ref 0.2–1.2)
BUN SERPL-MCNC: 15 MG/DL — SIGNIFICANT CHANGE UP (ref 7–23)
CALCIUM SERPL-MCNC: 9.7 MG/DL — SIGNIFICANT CHANGE UP (ref 8.4–10.5)
CHLORIDE SERPL-SCNC: 102 MMOL/L — SIGNIFICANT CHANGE UP (ref 96–108)
CO2 SERPL-SCNC: 23 MMOL/L — SIGNIFICANT CHANGE UP (ref 22–31)
CREAT SERPL-MCNC: 1.33 MG/DL — HIGH (ref 0.5–1.3)
EGFR: 42 ML/MIN/1.73M2 — LOW
EOSINOPHIL # BLD AUTO: 0 K/UL — SIGNIFICANT CHANGE UP (ref 0–0.5)
EOSINOPHIL NFR BLD AUTO: 0 % — SIGNIFICANT CHANGE UP (ref 0–6)
GLUCOSE SERPL-MCNC: 142 MG/DL — HIGH (ref 70–99)
HCT VFR BLD CALC: 28.8 % — LOW (ref 34.5–45)
HGB BLD-MCNC: 9.8 G/DL — LOW (ref 11.5–15.5)
LYMPHOCYTES # BLD AUTO: 1.21 K/UL — SIGNIFICANT CHANGE UP (ref 1–3.3)
LYMPHOCYTES # BLD AUTO: 26 % — SIGNIFICANT CHANGE UP (ref 13–44)
MCHC RBC-ENTMCNC: 31.1 PG — SIGNIFICANT CHANGE UP (ref 27–34)
MCHC RBC-ENTMCNC: 34 G/DL — SIGNIFICANT CHANGE UP (ref 32–36)
MCV RBC AUTO: 91.4 FL — SIGNIFICANT CHANGE UP (ref 80–100)
MONOCYTES # BLD AUTO: 0.65 K/UL — SIGNIFICANT CHANGE UP (ref 0–0.9)
MONOCYTES NFR BLD AUTO: 14 % — SIGNIFICANT CHANGE UP (ref 2–14)
NEUTROPHILS # BLD AUTO: 2.78 K/UL — SIGNIFICANT CHANGE UP (ref 1.8–7.4)
NEUTROPHILS NFR BLD AUTO: 60 % — SIGNIFICANT CHANGE UP (ref 43–77)
NRBC # BLD: 0 /100 — SIGNIFICANT CHANGE UP (ref 0–0)
NRBC # BLD: SIGNIFICANT CHANGE UP /100 WBCS (ref 0–0)
PLAT MORPH BLD: NORMAL — SIGNIFICANT CHANGE UP
PLATELET # BLD AUTO: 334 K/UL — SIGNIFICANT CHANGE UP (ref 150–400)
POTASSIUM SERPL-MCNC: 3.9 MMOL/L — SIGNIFICANT CHANGE UP (ref 3.5–5.3)
POTASSIUM SERPL-SCNC: 3.9 MMOL/L — SIGNIFICANT CHANGE UP (ref 3.5–5.3)
PROT SERPL-MCNC: 7 G/DL — SIGNIFICANT CHANGE UP (ref 6–8.3)
RBC # BLD: 3.15 M/UL — LOW (ref 3.8–5.2)
RBC # FLD: 13.3 % — SIGNIFICANT CHANGE UP (ref 10.3–14.5)
RBC BLD AUTO: SIGNIFICANT CHANGE UP
SODIUM SERPL-SCNC: 137 MMOL/L — SIGNIFICANT CHANGE UP (ref 135–145)
WBC # BLD: 4.64 K/UL — SIGNIFICANT CHANGE UP (ref 3.8–10.5)
WBC # FLD AUTO: 4.64 K/UL — SIGNIFICANT CHANGE UP (ref 3.8–10.5)

## 2022-08-17 ENCOUNTER — NON-APPOINTMENT (OUTPATIENT)
Age: 72
End: 2022-08-17

## 2022-08-18 ENCOUNTER — APPOINTMENT (OUTPATIENT)
Age: 72
End: 2022-08-18

## 2022-08-24 ENCOUNTER — NON-APPOINTMENT (OUTPATIENT)
Age: 72
End: 2022-08-24

## 2022-08-30 ENCOUNTER — OUTPATIENT (OUTPATIENT)
Dept: OUTPATIENT SERVICES | Facility: HOSPITAL | Age: 72
LOS: 1 days | Discharge: HOME | End: 2022-08-30

## 2022-08-30 DIAGNOSIS — C50.919 MALIGNANT NEOPLASM OF UNSPECIFIED SITE OF UNSPECIFIED FEMALE BREAST: ICD-10-CM

## 2022-08-31 ENCOUNTER — NON-APPOINTMENT (OUTPATIENT)
Age: 72
End: 2022-08-31

## 2022-09-13 ENCOUNTER — RESULT REVIEW (OUTPATIENT)
Age: 72
End: 2022-09-13

## 2022-09-13 ENCOUNTER — APPOINTMENT (OUTPATIENT)
Age: 72
End: 2022-09-13

## 2022-09-13 DIAGNOSIS — R11.2 NAUSEA WITH VOMITING, UNSPECIFIED: ICD-10-CM

## 2022-09-13 DIAGNOSIS — Z51.89 ENCOUNTER FOR OTHER SPECIFIED AFTERCARE: ICD-10-CM

## 2022-09-13 DIAGNOSIS — Z51.11 ENCOUNTER FOR ANTINEOPLASTIC CHEMOTHERAPY: ICD-10-CM

## 2022-09-13 LAB
ALBUMIN SERPL ELPH-MCNC: 4 G/DL — SIGNIFICANT CHANGE UP (ref 3.3–5)
ALP SERPL-CCNC: 62 U/L — SIGNIFICANT CHANGE UP (ref 40–120)
ALT FLD-CCNC: 18 U/L — SIGNIFICANT CHANGE UP (ref 10–45)
ANION GAP SERPL CALC-SCNC: 13 MMOL/L — SIGNIFICANT CHANGE UP (ref 5–17)
AST SERPL-CCNC: 25 U/L — SIGNIFICANT CHANGE UP (ref 10–40)
BASOPHILS # BLD AUTO: 0.04 K/UL — SIGNIFICANT CHANGE UP (ref 0–0.2)
BASOPHILS NFR BLD AUTO: 0.7 % — SIGNIFICANT CHANGE UP (ref 0–2)
BILIRUB SERPL-MCNC: 0.4 MG/DL — SIGNIFICANT CHANGE UP (ref 0.2–1.2)
BUN SERPL-MCNC: 15 MG/DL — SIGNIFICANT CHANGE UP (ref 7–23)
CALCIUM SERPL-MCNC: 9.7 MG/DL — SIGNIFICANT CHANGE UP (ref 8.4–10.5)
CHLORIDE SERPL-SCNC: 103 MMOL/L — SIGNIFICANT CHANGE UP (ref 96–108)
CO2 SERPL-SCNC: 24 MMOL/L — SIGNIFICANT CHANGE UP (ref 22–31)
CREAT SERPL-MCNC: 1.35 MG/DL — HIGH (ref 0.5–1.3)
EGFR: 41 ML/MIN/1.73M2 — LOW
EOSINOPHIL # BLD AUTO: 0.05 K/UL — SIGNIFICANT CHANGE UP (ref 0–0.5)
EOSINOPHIL NFR BLD AUTO: 0.9 % — SIGNIFICANT CHANGE UP (ref 0–6)
GLUCOSE SERPL-MCNC: 93 MG/DL — SIGNIFICANT CHANGE UP (ref 70–99)
HCT VFR BLD CALC: 28.8 % — LOW (ref 34.5–45)
HGB BLD-MCNC: 9.7 G/DL — LOW (ref 11.5–15.5)
IMM GRANULOCYTES NFR BLD AUTO: 0.3 % — SIGNIFICANT CHANGE UP (ref 0–1.5)
LYMPHOCYTES # BLD AUTO: 1.86 K/UL — SIGNIFICANT CHANGE UP (ref 1–3.3)
LYMPHOCYTES # BLD AUTO: 31.9 % — SIGNIFICANT CHANGE UP (ref 13–44)
MCHC RBC-ENTMCNC: 31.1 PG — SIGNIFICANT CHANGE UP (ref 27–34)
MCHC RBC-ENTMCNC: 33.7 G/DL — SIGNIFICANT CHANGE UP (ref 32–36)
MCV RBC AUTO: 92.3 FL — SIGNIFICANT CHANGE UP (ref 80–100)
MONOCYTES # BLD AUTO: 1.42 K/UL — HIGH (ref 0–0.9)
MONOCYTES NFR BLD AUTO: 24.4 % — HIGH (ref 2–14)
NEUTROPHILS # BLD AUTO: 2.44 K/UL — SIGNIFICANT CHANGE UP (ref 1.8–7.4)
NEUTROPHILS NFR BLD AUTO: 41.8 % — LOW (ref 43–77)
NRBC # BLD: 0 /100 WBCS — SIGNIFICANT CHANGE UP (ref 0–0)
PLATELET # BLD AUTO: 197 K/UL — SIGNIFICANT CHANGE UP (ref 150–400)
POTASSIUM SERPL-MCNC: 4.1 MMOL/L — SIGNIFICANT CHANGE UP (ref 3.5–5.3)
POTASSIUM SERPL-SCNC: 4.1 MMOL/L — SIGNIFICANT CHANGE UP (ref 3.5–5.3)
PROT SERPL-MCNC: 7 G/DL — SIGNIFICANT CHANGE UP (ref 6–8.3)
RBC # BLD: 3.12 M/UL — LOW (ref 3.8–5.2)
RBC # FLD: 15.3 % — HIGH (ref 10.3–14.5)
SODIUM SERPL-SCNC: 140 MMOL/L — SIGNIFICANT CHANGE UP (ref 135–145)
WBC # BLD: 5.83 K/UL — SIGNIFICANT CHANGE UP (ref 3.8–10.5)
WBC # FLD AUTO: 5.83 K/UL — SIGNIFICANT CHANGE UP (ref 3.8–10.5)

## 2022-09-14 LAB — TSH SERPL-MCNC: 2.38 UIU/ML — SIGNIFICANT CHANGE UP (ref 0.27–4.2)

## 2022-09-15 ENCOUNTER — NON-APPOINTMENT (OUTPATIENT)
Age: 72
End: 2022-09-15

## 2022-10-06 ENCOUNTER — APPOINTMENT (OUTPATIENT)
Age: 72
End: 2022-10-06

## 2022-10-06 ENCOUNTER — RESULT REVIEW (OUTPATIENT)
Age: 72
End: 2022-10-06

## 2022-10-06 LAB
BASOPHILS # BLD AUTO: 0.03 K/UL — SIGNIFICANT CHANGE UP (ref 0–0.2)
BASOPHILS NFR BLD AUTO: 0.7 % — SIGNIFICANT CHANGE UP (ref 0–2)
EOSINOPHIL # BLD AUTO: 0.02 K/UL — SIGNIFICANT CHANGE UP (ref 0–0.5)
EOSINOPHIL NFR BLD AUTO: 0.5 % — SIGNIFICANT CHANGE UP (ref 0–6)
HCT VFR BLD CALC: 23.4 % — LOW (ref 34.5–45)
HGB BLD-MCNC: 7.9 G/DL — LOW (ref 11.5–15.5)
IMM GRANULOCYTES NFR BLD AUTO: 0.9 % — SIGNIFICANT CHANGE UP (ref 0–0.9)
LYMPHOCYTES # BLD AUTO: 0.54 K/UL — LOW (ref 1–3.3)
LYMPHOCYTES # BLD AUTO: 12.7 % — LOW (ref 13–44)
MCHC RBC-ENTMCNC: 31.7 PG — SIGNIFICANT CHANGE UP (ref 27–34)
MCHC RBC-ENTMCNC: 33.8 G/DL — SIGNIFICANT CHANGE UP (ref 32–36)
MCV RBC AUTO: 94 FL — SIGNIFICANT CHANGE UP (ref 80–100)
MONOCYTES # BLD AUTO: 1.26 K/UL — HIGH (ref 0–0.9)
MONOCYTES NFR BLD AUTO: 29.7 % — HIGH (ref 2–14)
NEUTROPHILS # BLD AUTO: 2.35 K/UL — SIGNIFICANT CHANGE UP (ref 1.8–7.4)
NEUTROPHILS NFR BLD AUTO: 55.5 % — SIGNIFICANT CHANGE UP (ref 43–77)
NRBC # BLD: 0 /100 WBCS — SIGNIFICANT CHANGE UP (ref 0–0)
PLATELET # BLD AUTO: 276 K/UL — SIGNIFICANT CHANGE UP (ref 150–400)
RBC # BLD: 2.49 M/UL — LOW (ref 3.8–5.2)
RBC # FLD: 16.7 % — HIGH (ref 10.3–14.5)
WBC # BLD: 4.24 K/UL — SIGNIFICANT CHANGE UP (ref 3.8–10.5)
WBC # FLD AUTO: 4.24 K/UL — SIGNIFICANT CHANGE UP (ref 3.8–10.5)

## 2022-10-06 PROCEDURE — 99214 OFFICE O/P EST MOD 30 MIN: CPT

## 2022-10-06 RX ORDER — CLOTRIMAZOLE 10 MG/1
10 LOZENGE ORAL DAILY
Qty: 50 | Refills: 0 | Status: ACTIVE | COMMUNITY
Start: 2022-10-06 | End: 1900-01-01

## 2022-10-07 NOTE — ASSESSMENT
[FreeTextEntry1] : Ms. BALDERRAMA 's questions were answered to her satisfaction. \par She  expressed her  understanding and willingness to comply with the above recommendations, and  will return to the office in 3 weeks.\par \par

## 2022-10-07 NOTE — HISTORY OF PRESENT ILLNESS
[Disease: _____________________] : Disease: [unfilled] [de-identified] : 72F,originally from [place], with PMX HTN, hypothyroidism, s/p , referred for medical oncology consultation of right breast, ER/LA/HER2 negative mixed poorly differentiated ductal/lobular carcinoma.\par \par CASE SYNOPSIS:\par 3/30/22: Bilateral screening mammogram–spiculated masses/architectural distortion in the outer central right breast.  Subcentimeter masses seen in the lower central and lower inner right breast N4–5 cm.  Questionable architectural distortion in the central left breast, N 6 cm seen on cc view.\par \par 5/3/2022 diagnostic mammography and breast ultrasound; for masses seen in the right breast at 9:00, N 12-14 cm, highly suggestive of malignancy, for which US guided core biopsy recommended.\par \par 2022–US core biopsy right breast mass; pathology consistent with invasive, mixed poorly differentiated ductal/lobular carcinoma, ER, [place], HER2 negative.\par \par 2022: CT C/A/P–multiple, small retroperitoneal lymph nodes, considered mildly enlarged, nonspecific, chronic/reactive, less likely metastatic disease from breast cancer.\par \par 2022: Bilateral breast MRI: Conglomerate grouping of small irregular masses in the posterior lower outer right breast spanning 3.3 cm corresponding to the site of biopsy-proven malignancy with contiguous enhancing foci and branched non-mass enhancement extending an additional 5.5 cm anterior to the conglomerate masses, concerning for additional disease.  MRI guided biopsy of a more anterior aspect of this none mass enhancement is suggested if breast conservation is considered.\par 0.7 cm ovoid enhancing mass in the lower outer right breast anterior depth, approximately 3.5 cm posterior to the nipple, indeterminate.  MRI guided biopsy recommended\par No suspicious enhancing findings in the left breast.  No lymphadenopathy.\par \par 2022–MRI guided vacuum-assisted core needle biopsy; pathology:\par RIGHT\par Site A (outer lower region):\par Invasive lobular carcinoma, classic type, [place] score 6/9, invasive tumor present in several small foci, all measuring < 0.1 cm, LCIS classic type,\par Site B (anterior outer lower region):\par Intraductal papilloma with adjacent foci of columnar cell change, apocrine metaplasia, microcyst formation and simple adenosis.\par \par 2022: PET scan–preliminary report negative\par \par 7/15/22–[id number]-[id number] placement left chest wall\par \par 22- cycle #1 AC/ pembrolizumab\par  [de-identified] : Moderately differentiated invasive lobular carcinoma [de-identified] : ER negative, UT negative, HER2 negative [FreeTextEntry1] : Neoadjuvant systemic chemotherapy [de-identified] : Here for cycle #4 AC; hemoglobin at 7.4 g/dL and patient complaining of fatigue.  Denies chest pain or palpitations.\par Since her last visit and despite forcing herself to eat, she has lost approximately 11 pounds; followed up with nutrition.\par Tested positive for COVID infection at the end of August; self quarantine until 9/5/2022.  Reports no residual respiratory symptoms.\par Appears tired; developed alopecia.  Denies fevers, falls, fractures.\par \par \par

## 2022-10-07 NOTE — REVIEW OF SYSTEMS
[Negative] : Psychiatric [Fatigue] : fatigue [Recent Change In Weight] : ~T recent weight change [Constipation] : constipation [FreeTextEntry2] : + Alopecia; lost 11 pounds since August 2022

## 2022-10-07 NOTE — PHYSICAL EXAM
[Normal] : normal appearance, no rash, nodules, vesicles, ulcers, erythema [Restricted in physically strenuous activity but ambulatory and able to carry out work of a light or sedentary nature] : Status 1- Restricted in physically strenuous activity but ambulatory and able to carry out work of a light or sedentary nature, e.g., light house work, office work [Thrush] : thrush [de-identified] : + [de-identified] : normal breast inspection and palpation of axillas . Nodularity along the right breast 9:00 location, but no discrete palpable mass.

## 2022-10-07 NOTE — RESULTS/DATA
[FreeTextEntry1] : Blood work results, imaging studies, and pathology reports reviewed in detail with patient .\par \par  Statement Selected

## 2022-10-10 ENCOUNTER — RESULT REVIEW (OUTPATIENT)
Age: 72
End: 2022-10-10

## 2022-10-10 ENCOUNTER — APPOINTMENT (OUTPATIENT)
Age: 72
End: 2022-10-10

## 2022-10-10 LAB
ALBUMIN SERPL ELPH-MCNC: 3.7 G/DL — SIGNIFICANT CHANGE UP (ref 3.3–5)
ALP SERPL-CCNC: 69 U/L — SIGNIFICANT CHANGE UP (ref 40–120)
ALT FLD-CCNC: 8 U/L — LOW (ref 10–45)
ANION GAP SERPL CALC-SCNC: 11 MMOL/L — SIGNIFICANT CHANGE UP (ref 5–17)
AST SERPL-CCNC: 17 U/L — SIGNIFICANT CHANGE UP (ref 10–40)
BASOPHILS # BLD AUTO: 0.02 K/UL — SIGNIFICANT CHANGE UP (ref 0–0.2)
BASOPHILS NFR BLD AUTO: 0.5 % — SIGNIFICANT CHANGE UP (ref 0–2)
BILIRUB SERPL-MCNC: 0.2 MG/DL — SIGNIFICANT CHANGE UP (ref 0.2–1.2)
BUN SERPL-MCNC: 9 MG/DL — SIGNIFICANT CHANGE UP (ref 7–23)
CALCIUM SERPL-MCNC: 9.6 MG/DL — SIGNIFICANT CHANGE UP (ref 8.4–10.5)
CHLORIDE SERPL-SCNC: 102 MMOL/L — SIGNIFICANT CHANGE UP (ref 96–108)
CO2 SERPL-SCNC: 25 MMOL/L — SIGNIFICANT CHANGE UP (ref 22–31)
CREAT SERPL-MCNC: 1.18 MG/DL — SIGNIFICANT CHANGE UP (ref 0.5–1.3)
EGFR: 48 ML/MIN/1.73M2 — LOW
EOSINOPHIL # BLD AUTO: 0.01 K/UL — SIGNIFICANT CHANGE UP (ref 0–0.5)
EOSINOPHIL NFR BLD AUTO: 0.3 % — SIGNIFICANT CHANGE UP (ref 0–6)
FERRITIN SERPL-MCNC: 403 NG/ML — HIGH (ref 15–150)
GLUCOSE SERPL-MCNC: 89 MG/DL — SIGNIFICANT CHANGE UP (ref 70–99)
HCT VFR BLD CALC: 22.8 % — LOW (ref 34.5–45)
HGB BLD-MCNC: 7.6 G/DL — LOW (ref 11.5–15.5)
IMM GRANULOCYTES NFR BLD AUTO: 1 % — HIGH (ref 0–0.9)
LYMPHOCYTES # BLD AUTO: 0.61 K/UL — LOW (ref 1–3.3)
LYMPHOCYTES # BLD AUTO: 15.4 % — SIGNIFICANT CHANGE UP (ref 13–44)
MCHC RBC-ENTMCNC: 31.7 PG — SIGNIFICANT CHANGE UP (ref 27–34)
MCHC RBC-ENTMCNC: 33.3 G/DL — SIGNIFICANT CHANGE UP (ref 32–36)
MCV RBC AUTO: 95 FL — SIGNIFICANT CHANGE UP (ref 80–100)
MONOCYTES # BLD AUTO: 1.05 K/UL — HIGH (ref 0–0.9)
MONOCYTES NFR BLD AUTO: 26.5 % — HIGH (ref 2–14)
NEUTROPHILS # BLD AUTO: 2.23 K/UL — SIGNIFICANT CHANGE UP (ref 1.8–7.4)
NEUTROPHILS NFR BLD AUTO: 56.3 % — SIGNIFICANT CHANGE UP (ref 43–77)
NRBC # BLD: 0 /100 WBCS — SIGNIFICANT CHANGE UP (ref 0–0)
PLATELET # BLD AUTO: 205 K/UL — SIGNIFICANT CHANGE UP (ref 150–400)
POTASSIUM SERPL-MCNC: 4 MMOL/L — SIGNIFICANT CHANGE UP (ref 3.5–5.3)
POTASSIUM SERPL-SCNC: 4 MMOL/L — SIGNIFICANT CHANGE UP (ref 3.5–5.3)
PROT SERPL-MCNC: 6.9 G/DL — SIGNIFICANT CHANGE UP (ref 6–8.3)
RBC # BLD: 2.4 M/UL — LOW (ref 3.8–5.2)
RBC # FLD: 17.6 % — HIGH (ref 10.3–14.5)
SODIUM SERPL-SCNC: 138 MMOL/L — SIGNIFICANT CHANGE UP (ref 135–145)
WBC # BLD: 3.96 K/UL — SIGNIFICANT CHANGE UP (ref 3.8–10.5)
WBC # FLD AUTO: 3.96 K/UL — SIGNIFICANT CHANGE UP (ref 3.8–10.5)

## 2022-10-10 PROCEDURE — 36561 INSERT TUNNELED CV CATH: CPT

## 2022-10-10 PROCEDURE — C1788: CPT

## 2022-10-10 PROCEDURE — 86900 BLOOD TYPING SEROLOGIC ABO: CPT

## 2022-10-10 PROCEDURE — 77001 FLUOROGUIDE FOR VEIN DEVICE: CPT

## 2022-10-10 PROCEDURE — 76937 US GUIDE VASCULAR ACCESS: CPT

## 2022-10-10 PROCEDURE — 86901 BLOOD TYPING SEROLOGIC RH(D): CPT

## 2022-10-10 PROCEDURE — C1769: CPT

## 2022-10-10 PROCEDURE — C1887: CPT

## 2022-10-10 PROCEDURE — 86850 RBC ANTIBODY SCREEN: CPT

## 2022-10-10 PROCEDURE — C1894: CPT

## 2022-10-11 ENCOUNTER — NON-APPOINTMENT (OUTPATIENT)
Age: 72
End: 2022-10-11

## 2022-10-11 DIAGNOSIS — D50.9 IRON DEFICIENCY ANEMIA, UNSPECIFIED: ICD-10-CM

## 2022-10-14 ENCOUNTER — APPOINTMENT (OUTPATIENT)
Age: 72
End: 2022-10-14

## 2022-10-14 ENCOUNTER — RESULT REVIEW (OUTPATIENT)
Age: 72
End: 2022-10-14

## 2022-10-14 ENCOUNTER — OUTPATIENT (OUTPATIENT)
Dept: OUTPATIENT SERVICES | Facility: HOSPITAL | Age: 72
LOS: 1 days | End: 2022-10-14
Payer: COMMERCIAL

## 2022-10-14 DIAGNOSIS — C50.919 MALIGNANT NEOPLASM OF UNSPECIFIED SITE OF UNSPECIFIED FEMALE BREAST: ICD-10-CM

## 2022-10-14 LAB
BASOPHILS # BLD AUTO: 0.04 K/UL — SIGNIFICANT CHANGE UP (ref 0–0.2)
BASOPHILS NFR BLD AUTO: 0.9 % — SIGNIFICANT CHANGE UP (ref 0–2)
EOSINOPHIL # BLD AUTO: 0.06 K/UL — SIGNIFICANT CHANGE UP (ref 0–0.5)
EOSINOPHIL NFR BLD AUTO: 1.3 % — SIGNIFICANT CHANGE UP (ref 0–6)
HCT VFR BLD CALC: 23.9 % — LOW (ref 34.5–45)
HGB BLD-MCNC: 8 G/DL — LOW (ref 11.5–15.5)
IMM GRANULOCYTES NFR BLD AUTO: 0.9 % — SIGNIFICANT CHANGE UP (ref 0–0.9)
LYMPHOCYTES # BLD AUTO: 0.67 K/UL — LOW (ref 1–3.3)
LYMPHOCYTES # BLD AUTO: 14.5 % — SIGNIFICANT CHANGE UP (ref 13–44)
MCHC RBC-ENTMCNC: 32 PG — SIGNIFICANT CHANGE UP (ref 27–34)
MCHC RBC-ENTMCNC: 33.5 G/DL — SIGNIFICANT CHANGE UP (ref 32–36)
MCV RBC AUTO: 95.6 FL — SIGNIFICANT CHANGE UP (ref 80–100)
MONOCYTES # BLD AUTO: 1.44 K/UL — HIGH (ref 0–0.9)
MONOCYTES NFR BLD AUTO: 31.2 % — HIGH (ref 2–14)
NEUTROPHILS # BLD AUTO: 2.37 K/UL — SIGNIFICANT CHANGE UP (ref 1.8–7.4)
NEUTROPHILS NFR BLD AUTO: 51.2 % — SIGNIFICANT CHANGE UP (ref 43–77)
NRBC # BLD: 0 /100 WBCS — SIGNIFICANT CHANGE UP (ref 0–0)
PLATELET # BLD AUTO: 155 K/UL — SIGNIFICANT CHANGE UP (ref 150–400)
RBC # BLD: 2.5 M/UL — LOW (ref 3.8–5.2)
RBC # FLD: 18.4 % — HIGH (ref 10.3–14.5)
WBC # BLD: 4.62 K/UL — SIGNIFICANT CHANGE UP (ref 3.8–10.5)
WBC # FLD AUTO: 4.62 K/UL — SIGNIFICANT CHANGE UP (ref 3.8–10.5)

## 2022-10-15 ENCOUNTER — APPOINTMENT (OUTPATIENT)
Age: 72
End: 2022-10-15

## 2022-10-15 ENCOUNTER — RESULT REVIEW (OUTPATIENT)
Age: 72
End: 2022-10-15

## 2022-10-15 PROCEDURE — 86900 BLOOD TYPING SEROLOGIC ABO: CPT

## 2022-10-15 PROCEDURE — 86850 RBC ANTIBODY SCREEN: CPT

## 2022-10-15 PROCEDURE — 86901 BLOOD TYPING SEROLOGIC RH(D): CPT

## 2022-10-15 PROCEDURE — 86923 COMPATIBILITY TEST ELECTRIC: CPT

## 2022-10-20 ENCOUNTER — APPOINTMENT (OUTPATIENT)
Age: 72
End: 2022-10-20

## 2022-10-27 ENCOUNTER — APPOINTMENT (OUTPATIENT)
Age: 72
End: 2022-10-27

## 2022-10-27 ENCOUNTER — RESULT REVIEW (OUTPATIENT)
Age: 72
End: 2022-10-27

## 2022-10-27 ENCOUNTER — OUTPATIENT (OUTPATIENT)
Dept: OUTPATIENT SERVICES | Facility: HOSPITAL | Age: 72
LOS: 1 days | Discharge: HOME | End: 2022-10-27

## 2022-10-27 DIAGNOSIS — C50.919 MALIGNANT NEOPLASM OF UNSPECIFIED SITE OF UNSPECIFIED FEMALE BREAST: ICD-10-CM

## 2022-10-27 DIAGNOSIS — Z51.11 ENCOUNTER FOR ANTINEOPLASTIC CHEMOTHERAPY: ICD-10-CM

## 2022-10-27 LAB
BASOPHILS # BLD AUTO: 0.04 K/UL — SIGNIFICANT CHANGE UP (ref 0–0.2)
BASOPHILS NFR BLD AUTO: 1.2 % — SIGNIFICANT CHANGE UP (ref 0–2)
EOSINOPHIL # BLD AUTO: 0.01 K/UL — SIGNIFICANT CHANGE UP (ref 0–0.5)
EOSINOPHIL NFR BLD AUTO: 0.3 % — SIGNIFICANT CHANGE UP (ref 0–6)
HCT VFR BLD CALC: 27.1 % — LOW (ref 34.5–45)
HGB BLD-MCNC: 9.2 G/DL — LOW (ref 11.5–15.5)
IMM GRANULOCYTES NFR BLD AUTO: 3.7 % — HIGH (ref 0–0.9)
LYMPHOCYTES # BLD AUTO: 0.24 K/UL — LOW (ref 1–3.3)
LYMPHOCYTES # BLD AUTO: 7.4 % — LOW (ref 13–44)
MCHC RBC-ENTMCNC: 31.6 PG — SIGNIFICANT CHANGE UP (ref 27–34)
MCHC RBC-ENTMCNC: 33.9 G/DL — SIGNIFICANT CHANGE UP (ref 32–36)
MCV RBC AUTO: 93.1 FL — SIGNIFICANT CHANGE UP (ref 80–100)
MONOCYTES # BLD AUTO: 0.62 K/UL — SIGNIFICANT CHANGE UP (ref 0–0.9)
MONOCYTES NFR BLD AUTO: 19 % — HIGH (ref 2–14)
NEUTROPHILS # BLD AUTO: 2.23 K/UL — SIGNIFICANT CHANGE UP (ref 1.8–7.4)
NEUTROPHILS NFR BLD AUTO: 68.4 % — SIGNIFICANT CHANGE UP (ref 43–77)
NRBC # BLD: 0 /100 WBCS — SIGNIFICANT CHANGE UP (ref 0–0)
PLATELET # BLD AUTO: 46 K/UL — LOW (ref 150–400)
RBC # BLD: 2.91 M/UL — LOW (ref 3.8–5.2)
RBC # FLD: 15.6 % — HIGH (ref 10.3–14.5)
TSH SERPL-MCNC: 11.7 UIU/ML — HIGH (ref 0.27–4.2)
WBC # BLD: 3.26 K/UL — LOW (ref 3.8–10.5)
WBC # FLD AUTO: 3.26 K/UL — LOW (ref 3.8–10.5)

## 2022-11-03 ENCOUNTER — RESULT REVIEW (OUTPATIENT)
Age: 72
End: 2022-11-03

## 2022-11-03 ENCOUNTER — APPOINTMENT (OUTPATIENT)
Age: 72
End: 2022-11-03

## 2022-11-03 ENCOUNTER — NON-APPOINTMENT (OUTPATIENT)
Age: 72
End: 2022-11-03

## 2022-11-03 LAB
ALBUMIN SERPL ELPH-MCNC: 4 G/DL — SIGNIFICANT CHANGE UP (ref 3.3–5)
ALP SERPL-CCNC: 83 U/L — SIGNIFICANT CHANGE UP (ref 40–120)
ALT FLD-CCNC: 18 U/L — SIGNIFICANT CHANGE UP (ref 10–45)
ANION GAP SERPL CALC-SCNC: 12 MMOL/L — SIGNIFICANT CHANGE UP (ref 5–17)
AST SERPL-CCNC: 23 U/L — SIGNIFICANT CHANGE UP (ref 10–40)
BASOPHILS # BLD AUTO: 0 K/UL — SIGNIFICANT CHANGE UP (ref 0–0.2)
BASOPHILS NFR BLD AUTO: 0 % — SIGNIFICANT CHANGE UP (ref 0–2)
BILIRUB SERPL-MCNC: 0.2 MG/DL — SIGNIFICANT CHANGE UP (ref 0.2–1.2)
BUN SERPL-MCNC: 12 MG/DL — SIGNIFICANT CHANGE UP (ref 7–23)
CALCIUM SERPL-MCNC: 9.5 MG/DL — SIGNIFICANT CHANGE UP (ref 8.4–10.5)
CHLORIDE SERPL-SCNC: 100 MMOL/L — SIGNIFICANT CHANGE UP (ref 96–108)
CO2 SERPL-SCNC: 26 MMOL/L — SIGNIFICANT CHANGE UP (ref 22–31)
CREAT SERPL-MCNC: 1.09 MG/DL — SIGNIFICANT CHANGE UP (ref 0.5–1.3)
EGFR: 53 ML/MIN/1.73M2 — LOW
EOSINOPHIL # BLD AUTO: 0 K/UL — SIGNIFICANT CHANGE UP (ref 0–0.5)
EOSINOPHIL NFR BLD AUTO: 0 % — SIGNIFICANT CHANGE UP (ref 0–6)
GLUCOSE SERPL-MCNC: 97 MG/DL — SIGNIFICANT CHANGE UP (ref 70–99)
HCT VFR BLD CALC: 26.5 % — LOW (ref 34.5–45)
HGB BLD-MCNC: 9.1 G/DL — LOW (ref 11.5–15.5)
LYMPHOCYTES # BLD AUTO: 0.26 K/UL — LOW (ref 1–3.3)
LYMPHOCYTES # BLD AUTO: 5 % — LOW (ref 13–44)
MCHC RBC-ENTMCNC: 32 PG — SIGNIFICANT CHANGE UP (ref 27–34)
MCHC RBC-ENTMCNC: 34.3 G/DL — SIGNIFICANT CHANGE UP (ref 32–36)
MCV RBC AUTO: 93.3 FL — SIGNIFICANT CHANGE UP (ref 80–100)
MONOCYTES # BLD AUTO: 1.48 K/UL — HIGH (ref 0–0.9)
MONOCYTES NFR BLD AUTO: 29 % — HIGH (ref 2–14)
NEUTROPHILS # BLD AUTO: 3.37 K/UL — SIGNIFICANT CHANGE UP (ref 1.8–7.4)
NEUTROPHILS NFR BLD AUTO: 66 % — SIGNIFICANT CHANGE UP (ref 43–77)
NRBC # BLD: 0 /100 — SIGNIFICANT CHANGE UP (ref 0–0)
NRBC # BLD: SIGNIFICANT CHANGE UP /100 WBCS (ref 0–0)
PLAT MORPH BLD: NORMAL — SIGNIFICANT CHANGE UP
PLATELET # BLD AUTO: 236 K/UL — SIGNIFICANT CHANGE UP (ref 150–400)
POTASSIUM SERPL-MCNC: 4.2 MMOL/L — SIGNIFICANT CHANGE UP (ref 3.5–5.3)
POTASSIUM SERPL-SCNC: 4.2 MMOL/L — SIGNIFICANT CHANGE UP (ref 3.5–5.3)
PROT SERPL-MCNC: 7.2 G/DL — SIGNIFICANT CHANGE UP (ref 6–8.3)
RBC # BLD: 2.84 M/UL — LOW (ref 3.8–5.2)
RBC # FLD: 15.5 % — HIGH (ref 10.3–14.5)
RBC BLD AUTO: SIGNIFICANT CHANGE UP
SODIUM SERPL-SCNC: 137 MMOL/L — SIGNIFICANT CHANGE UP (ref 135–145)
WBC # BLD: 5.1 K/UL — SIGNIFICANT CHANGE UP (ref 3.8–10.5)
WBC # FLD AUTO: 5.1 K/UL — SIGNIFICANT CHANGE UP (ref 3.8–10.5)

## 2022-11-04 DIAGNOSIS — R11.2 NAUSEA WITH VOMITING, UNSPECIFIED: ICD-10-CM

## 2022-11-04 RX ORDER — PROCHLORPERAZINE MALEATE 10 MG/1
10 TABLET ORAL EVERY 6 HOURS
Qty: 24 | Refills: 2 | Status: ACTIVE | COMMUNITY
Start: 2022-11-04 | End: 1900-01-01

## 2022-11-04 RX ORDER — ONDANSETRON 4 MG/1
4 TABLET, ORALLY DISINTEGRATING ORAL EVERY 6 HOURS
Qty: 20 | Refills: 2 | Status: ACTIVE | COMMUNITY
Start: 2022-11-04 | End: 1900-01-01

## 2022-11-09 ENCOUNTER — NON-APPOINTMENT (OUTPATIENT)
Age: 72
End: 2022-11-09

## 2022-11-10 ENCOUNTER — APPOINTMENT (OUTPATIENT)
Age: 72
End: 2022-11-10

## 2022-11-10 ENCOUNTER — RESULT REVIEW (OUTPATIENT)
Age: 72
End: 2022-11-10

## 2022-11-10 LAB
BASOPHILS # BLD AUTO: 0.03 K/UL — SIGNIFICANT CHANGE UP (ref 0–0.2)
BASOPHILS NFR BLD AUTO: 3 % — HIGH (ref 0–2)
EOSINOPHIL # BLD AUTO: 0.02 K/UL — SIGNIFICANT CHANGE UP (ref 0–0.5)
EOSINOPHIL NFR BLD AUTO: 2 % — SIGNIFICANT CHANGE UP (ref 0–6)
HCT VFR BLD CALC: 25.9 % — LOW (ref 34.5–45)
HGB BLD-MCNC: 9.1 G/DL — LOW (ref 11.5–15.5)
IMM GRANULOCYTES NFR BLD AUTO: 2 % — HIGH (ref 0–0.9)
LYMPHOCYTES # BLD AUTO: 0.22 K/UL — LOW (ref 1–3.3)
LYMPHOCYTES # BLD AUTO: 21.8 % — SIGNIFICANT CHANGE UP (ref 13–44)
MCHC RBC-ENTMCNC: 32.9 PG — SIGNIFICANT CHANGE UP (ref 27–34)
MCHC RBC-ENTMCNC: 35.1 G/DL — SIGNIFICANT CHANGE UP (ref 32–36)
MCV RBC AUTO: 93.5 FL — SIGNIFICANT CHANGE UP (ref 80–100)
MONOCYTES # BLD AUTO: 0.3 K/UL — SIGNIFICANT CHANGE UP (ref 0–0.9)
MONOCYTES NFR BLD AUTO: 29.7 % — HIGH (ref 2–14)
NEUTROPHILS # BLD AUTO: 0.42 K/UL — LOW (ref 1.8–7.4)
NEUTROPHILS NFR BLD AUTO: 41.5 % — LOW (ref 43–77)
NRBC # BLD: 0 /100 WBCS — SIGNIFICANT CHANGE UP (ref 0–0)
PLATELET # BLD AUTO: 189 K/UL — SIGNIFICANT CHANGE UP (ref 150–400)
RBC # BLD: 2.77 M/UL — LOW (ref 3.8–5.2)
RBC # FLD: 14.7 % — HIGH (ref 10.3–14.5)
WBC # BLD: 1.01 K/UL — LOW (ref 3.8–10.5)
WBC # FLD AUTO: 1.01 K/UL — LOW (ref 3.8–10.5)

## 2022-11-16 ENCOUNTER — APPOINTMENT (OUTPATIENT)
Age: 72
End: 2022-11-16

## 2022-11-17 ENCOUNTER — RESULT REVIEW (OUTPATIENT)
Age: 72
End: 2022-11-17

## 2022-11-17 ENCOUNTER — APPOINTMENT (OUTPATIENT)
Age: 72
End: 2022-11-17

## 2022-11-17 LAB
BASOPHILS # BLD AUTO: 0.03 K/UL — SIGNIFICANT CHANGE UP (ref 0–0.2)
BASOPHILS NFR BLD AUTO: 0.9 % — SIGNIFICANT CHANGE UP (ref 0–2)
EOSINOPHIL # BLD AUTO: 0.12 K/UL — SIGNIFICANT CHANGE UP (ref 0–0.5)
EOSINOPHIL NFR BLD AUTO: 3.4 % — SIGNIFICANT CHANGE UP (ref 0–6)
HCT VFR BLD CALC: 25.4 % — LOW (ref 34.5–45)
HGB BLD-MCNC: 8.8 G/DL — LOW (ref 11.5–15.5)
IMM GRANULOCYTES NFR BLD AUTO: 1.4 % — HIGH (ref 0–0.9)
LYMPHOCYTES # BLD AUTO: 0.59 K/UL — LOW (ref 1–3.3)
LYMPHOCYTES # BLD AUTO: 16.9 % — SIGNIFICANT CHANGE UP (ref 13–44)
MCHC RBC-ENTMCNC: 32.6 PG — SIGNIFICANT CHANGE UP (ref 27–34)
MCHC RBC-ENTMCNC: 34.6 G/DL — SIGNIFICANT CHANGE UP (ref 32–36)
MCV RBC AUTO: 94.1 FL — SIGNIFICANT CHANGE UP (ref 80–100)
MONOCYTES # BLD AUTO: 1.35 K/UL — HIGH (ref 0–0.9)
MONOCYTES NFR BLD AUTO: 38.6 % — HIGH (ref 2–14)
NEUTROPHILS # BLD AUTO: 1.36 K/UL — LOW (ref 1.8–7.4)
NEUTROPHILS NFR BLD AUTO: 38.8 % — LOW (ref 43–77)
NRBC # BLD: 0 /100 WBCS — SIGNIFICANT CHANGE UP (ref 0–0)
PLATELET # BLD AUTO: 194 K/UL — SIGNIFICANT CHANGE UP (ref 150–400)
RBC # BLD: 2.7 M/UL — LOW (ref 3.8–5.2)
RBC # FLD: 15.9 % — HIGH (ref 10.3–14.5)
TSH SERPL-ACNC: 8.02 UIU/ML
WBC # BLD: 3.5 K/UL — LOW (ref 3.8–10.5)
WBC # FLD AUTO: 3.5 K/UL — LOW (ref 3.8–10.5)

## 2022-11-23 ENCOUNTER — RESULT REVIEW (OUTPATIENT)
Age: 72
End: 2022-11-23

## 2022-11-23 ENCOUNTER — APPOINTMENT (OUTPATIENT)
Age: 72
End: 2022-11-23

## 2022-11-23 DIAGNOSIS — Z51.89 ENCOUNTER FOR OTHER SPECIFIED AFTERCARE: ICD-10-CM

## 2022-11-23 LAB
BASOPHILS # BLD AUTO: 0.03 K/UL — SIGNIFICANT CHANGE UP (ref 0–0.2)
BASOPHILS NFR BLD AUTO: 1.9 % — SIGNIFICANT CHANGE UP (ref 0–2)
EOSINOPHIL # BLD AUTO: 0.09 K/UL — SIGNIFICANT CHANGE UP (ref 0–0.5)
EOSINOPHIL NFR BLD AUTO: 5.7 % — SIGNIFICANT CHANGE UP (ref 0–6)
HCT VFR BLD CALC: 24.3 % — LOW (ref 34.5–45)
HGB BLD-MCNC: 8.4 G/DL — LOW (ref 11.5–15.5)
IMM GRANULOCYTES NFR BLD AUTO: 1.9 % — HIGH (ref 0–0.9)
LYMPHOCYTES # BLD AUTO: 0.43 K/UL — LOW (ref 1–3.3)
LYMPHOCYTES # BLD AUTO: 27 % — SIGNIFICANT CHANGE UP (ref 13–44)
MCHC RBC-ENTMCNC: 32.2 PG — SIGNIFICANT CHANGE UP (ref 27–34)
MCHC RBC-ENTMCNC: 34.6 G/DL — SIGNIFICANT CHANGE UP (ref 32–36)
MCV RBC AUTO: 93.1 FL — SIGNIFICANT CHANGE UP (ref 80–100)
MONOCYTES # BLD AUTO: 0.29 K/UL — SIGNIFICANT CHANGE UP (ref 0–0.9)
MONOCYTES NFR BLD AUTO: 18.2 % — HIGH (ref 2–14)
NEUTROPHILS # BLD AUTO: 0.72 K/UL — LOW (ref 1.8–7.4)
NEUTROPHILS NFR BLD AUTO: 45.3 % — SIGNIFICANT CHANGE UP (ref 43–77)
NRBC # BLD: 0 /100 WBCS — SIGNIFICANT CHANGE UP (ref 0–0)
PLATELET # BLD AUTO: 184 K/UL — SIGNIFICANT CHANGE UP (ref 150–400)
RBC # BLD: 2.61 M/UL — LOW (ref 3.8–5.2)
RBC # FLD: 14.8 % — HIGH (ref 10.3–14.5)
WBC # BLD: 1.59 K/UL — LOW (ref 3.8–10.5)
WBC # FLD AUTO: 1.59 K/UL — LOW (ref 3.8–10.5)

## 2022-11-25 ENCOUNTER — APPOINTMENT (OUTPATIENT)
Age: 72
End: 2022-11-25

## 2022-11-26 ENCOUNTER — APPOINTMENT (OUTPATIENT)
Age: 72
End: 2022-11-26

## 2022-12-01 ENCOUNTER — RESULT REVIEW (OUTPATIENT)
Age: 72
End: 2022-12-01

## 2022-12-01 ENCOUNTER — APPOINTMENT (OUTPATIENT)
Age: 72
End: 2022-12-01

## 2022-12-01 LAB
ALBUMIN SERPL ELPH-MCNC: 3.9 G/DL — SIGNIFICANT CHANGE UP (ref 3.3–5)
ALP SERPL-CCNC: 81 U/L — SIGNIFICANT CHANGE UP (ref 40–120)
ALT FLD-CCNC: 16 U/L — SIGNIFICANT CHANGE UP (ref 10–45)
ANION GAP SERPL CALC-SCNC: 13 MMOL/L — SIGNIFICANT CHANGE UP (ref 5–17)
AST SERPL-CCNC: 25 U/L — SIGNIFICANT CHANGE UP (ref 10–40)
BASOPHILS # BLD AUTO: 0.06 K/UL — SIGNIFICANT CHANGE UP (ref 0–0.2)
BASOPHILS NFR BLD AUTO: 0.9 % — SIGNIFICANT CHANGE UP (ref 0–2)
BILIRUB SERPL-MCNC: 0.3 MG/DL — SIGNIFICANT CHANGE UP (ref 0.2–1.2)
BUN SERPL-MCNC: 12 MG/DL — SIGNIFICANT CHANGE UP (ref 7–23)
CALCIUM SERPL-MCNC: 9.9 MG/DL — SIGNIFICANT CHANGE UP (ref 8.4–10.5)
CHLORIDE SERPL-SCNC: 98 MMOL/L — SIGNIFICANT CHANGE UP (ref 96–108)
CO2 SERPL-SCNC: 25 MMOL/L — SIGNIFICANT CHANGE UP (ref 22–31)
CREAT SERPL-MCNC: 1.12 MG/DL — SIGNIFICANT CHANGE UP (ref 0.5–1.3)
EGFR: 52 ML/MIN/1.73M2 — LOW
EOSINOPHIL # BLD AUTO: 0.19 K/UL — SIGNIFICANT CHANGE UP (ref 0–0.5)
EOSINOPHIL NFR BLD AUTO: 2.8 % — SIGNIFICANT CHANGE UP (ref 0–6)
GLUCOSE SERPL-MCNC: 92 MG/DL — SIGNIFICANT CHANGE UP (ref 70–99)
HCT VFR BLD CALC: 26.7 % — LOW (ref 34.5–45)
HGB BLD-MCNC: 9 G/DL — LOW (ref 11.5–15.5)
IMM GRANULOCYTES NFR BLD AUTO: 4.7 % — HIGH (ref 0–0.9)
LYMPHOCYTES # BLD AUTO: 1.08 K/UL — SIGNIFICANT CHANGE UP (ref 1–3.3)
LYMPHOCYTES # BLD AUTO: 15.8 % — SIGNIFICANT CHANGE UP (ref 13–44)
MCHC RBC-ENTMCNC: 31.8 PG — SIGNIFICANT CHANGE UP (ref 27–34)
MCHC RBC-ENTMCNC: 33.7 G/DL — SIGNIFICANT CHANGE UP (ref 32–36)
MCV RBC AUTO: 94.3 FL — SIGNIFICANT CHANGE UP (ref 80–100)
MONOCYTES # BLD AUTO: 1.7 K/UL — HIGH (ref 0–0.9)
MONOCYTES NFR BLD AUTO: 24.9 % — HIGH (ref 2–14)
NEUTROPHILS # BLD AUTO: 3.48 K/UL — SIGNIFICANT CHANGE UP (ref 1.8–7.4)
NEUTROPHILS NFR BLD AUTO: 50.9 % — SIGNIFICANT CHANGE UP (ref 43–77)
NRBC # BLD: 0 /100 WBCS — SIGNIFICANT CHANGE UP (ref 0–0)
PLATELET # BLD AUTO: 158 K/UL — SIGNIFICANT CHANGE UP (ref 150–400)
POTASSIUM SERPL-MCNC: 4 MMOL/L — SIGNIFICANT CHANGE UP (ref 3.5–5.3)
POTASSIUM SERPL-SCNC: 4 MMOL/L — SIGNIFICANT CHANGE UP (ref 3.5–5.3)
PROT SERPL-MCNC: 6.9 G/DL — SIGNIFICANT CHANGE UP (ref 6–8.3)
RBC # BLD: 2.83 M/UL — LOW (ref 3.8–5.2)
RBC # FLD: 16.3 % — HIGH (ref 10.3–14.5)
SODIUM SERPL-SCNC: 137 MMOL/L — SIGNIFICANT CHANGE UP (ref 135–145)
TSH SERPL-MCNC: 1.46 UIU/ML — SIGNIFICANT CHANGE UP (ref 0.27–4.2)
WBC # BLD: 6.83 K/UL — SIGNIFICANT CHANGE UP (ref 3.8–10.5)
WBC # FLD AUTO: 6.83 K/UL — SIGNIFICANT CHANGE UP (ref 3.8–10.5)

## 2022-12-08 ENCOUNTER — RESULT REVIEW (OUTPATIENT)
Age: 72
End: 2022-12-08

## 2022-12-08 ENCOUNTER — APPOINTMENT (OUTPATIENT)
Age: 72
End: 2022-12-08

## 2022-12-08 LAB
ANISOCYTOSIS BLD QL: SLIGHT — SIGNIFICANT CHANGE UP
BASOPHILS # BLD AUTO: 0.01 K/UL — SIGNIFICANT CHANGE UP (ref 0–0.2)
BASOPHILS NFR BLD AUTO: 1 % — SIGNIFICANT CHANGE UP (ref 0–2)
EOSINOPHIL # BLD AUTO: 0.08 K/UL — SIGNIFICANT CHANGE UP (ref 0–0.5)
EOSINOPHIL NFR BLD AUTO: 8 % — HIGH (ref 0–6)
HCT VFR BLD CALC: 24.8 % — LOW (ref 34.5–45)
HGB BLD-MCNC: 8.4 G/DL — LOW (ref 11.5–15.5)
LYMPHOCYTES # BLD AUTO: 0.43 K/UL — LOW (ref 1–3.3)
LYMPHOCYTES # BLD AUTO: 43 % — SIGNIFICANT CHANGE UP (ref 13–44)
MCHC RBC-ENTMCNC: 31.9 PG — SIGNIFICANT CHANGE UP (ref 27–34)
MCHC RBC-ENTMCNC: 33.9 G/DL — SIGNIFICANT CHANGE UP (ref 32–36)
MCV RBC AUTO: 94.3 FL — SIGNIFICANT CHANGE UP (ref 80–100)
MONOCYTES # BLD AUTO: 0.16 K/UL — SIGNIFICANT CHANGE UP (ref 0–0.9)
MONOCYTES NFR BLD AUTO: 16 % — HIGH (ref 2–14)
NEUTROPHILS # BLD AUTO: 0.32 K/UL — LOW (ref 1.8–7.4)
NEUTROPHILS NFR BLD AUTO: 32 % — LOW (ref 43–77)
NRBC # BLD: 0 /100 — SIGNIFICANT CHANGE UP (ref 0–0)
NRBC # BLD: SIGNIFICANT CHANGE UP /100 WBCS (ref 0–0)
PLAT MORPH BLD: NORMAL — SIGNIFICANT CHANGE UP
PLATELET # BLD AUTO: 150 K/UL — SIGNIFICANT CHANGE UP (ref 150–400)
RBC # BLD: 2.63 M/UL — LOW (ref 3.8–5.2)
RBC # FLD: 15.5 % — HIGH (ref 10.3–14.5)
RBC BLD AUTO: SIGNIFICANT CHANGE UP
WBC # BLD: 1.01 K/UL — LOW (ref 3.8–10.5)
WBC # FLD AUTO: 1.01 K/UL — LOW (ref 3.8–10.5)

## 2022-12-12 ENCOUNTER — APPOINTMENT (OUTPATIENT)
Age: 72
End: 2022-12-12

## 2022-12-13 ENCOUNTER — APPOINTMENT (OUTPATIENT)
Age: 72
End: 2022-12-13

## 2022-12-14 ENCOUNTER — APPOINTMENT (OUTPATIENT)
Age: 72
End: 2022-12-14

## 2022-12-15 ENCOUNTER — OUTPATIENT (OUTPATIENT)
Dept: OUTPATIENT SERVICES | Facility: HOSPITAL | Age: 72
LOS: 1 days | End: 2022-12-15
Payer: COMMERCIAL

## 2022-12-15 ENCOUNTER — RESULT REVIEW (OUTPATIENT)
Age: 72
End: 2022-12-15

## 2022-12-15 ENCOUNTER — APPOINTMENT (OUTPATIENT)
Age: 72
End: 2022-12-15

## 2022-12-15 DIAGNOSIS — D64.3 OTHER SIDEROBLASTIC ANEMIAS: ICD-10-CM

## 2022-12-15 DIAGNOSIS — C50.911 MALIGNANT NEOPLASM OF UNSPECIFIED SITE OF RIGHT FEMALE BREAST: ICD-10-CM

## 2022-12-15 LAB
ANISOCYTOSIS BLD QL: SLIGHT — SIGNIFICANT CHANGE UP
BASOPHILS # BLD AUTO: 0.03 K/UL — SIGNIFICANT CHANGE UP (ref 0–0.2)
BASOPHILS NFR BLD AUTO: 1 % — SIGNIFICANT CHANGE UP (ref 0–2)
EOSINOPHIL # BLD AUTO: 0.1 K/UL — SIGNIFICANT CHANGE UP (ref 0–0.5)
EOSINOPHIL NFR BLD AUTO: 4 % — SIGNIFICANT CHANGE UP (ref 0–6)
HCT VFR BLD CALC: 23.4 % — LOW (ref 34.5–45)
HGB BLD-MCNC: 7.8 G/DL — LOW (ref 11.5–15.5)
LYMPHOCYTES # BLD AUTO: 1.03 K/UL — SIGNIFICANT CHANGE UP (ref 1–3.3)
LYMPHOCYTES # BLD AUTO: 40 % — SIGNIFICANT CHANGE UP (ref 13–44)
MCHC RBC-ENTMCNC: 32.4 PG — SIGNIFICANT CHANGE UP (ref 27–34)
MCHC RBC-ENTMCNC: 33.3 G/DL — SIGNIFICANT CHANGE UP (ref 32–36)
MCV RBC AUTO: 97.1 FL — SIGNIFICANT CHANGE UP (ref 80–100)
MONOCYTES # BLD AUTO: 0.75 K/UL — SIGNIFICANT CHANGE UP (ref 0–0.9)
MONOCYTES NFR BLD AUTO: 29 % — HIGH (ref 2–14)
NEUTROPHILS # BLD AUTO: 0.67 K/UL — LOW (ref 1.8–7.4)
NEUTROPHILS NFR BLD AUTO: 26 % — LOW (ref 43–77)
NRBC # BLD: 0 /100 — SIGNIFICANT CHANGE UP (ref 0–0)
NRBC # BLD: SIGNIFICANT CHANGE UP /100 WBCS (ref 0–0)
PLAT MORPH BLD: NORMAL — SIGNIFICANT CHANGE UP
PLATELET # BLD AUTO: 150 K/UL — SIGNIFICANT CHANGE UP (ref 150–400)
RBC # BLD: 2.41 M/UL — LOW (ref 3.8–5.2)
RBC # FLD: 17 % — HIGH (ref 10.3–14.5)
RBC BLD AUTO: SIGNIFICANT CHANGE UP
WBC # BLD: 2.58 K/UL — LOW (ref 3.8–10.5)
WBC # FLD AUTO: 2.58 K/UL — LOW (ref 3.8–10.5)

## 2022-12-15 PROCEDURE — 86900 BLOOD TYPING SEROLOGIC ABO: CPT

## 2022-12-15 PROCEDURE — 86923 COMPATIBILITY TEST ELECTRIC: CPT

## 2022-12-15 PROCEDURE — 86850 RBC ANTIBODY SCREEN: CPT

## 2022-12-15 PROCEDURE — 99214 OFFICE O/P EST MOD 30 MIN: CPT

## 2022-12-15 PROCEDURE — 86901 BLOOD TYPING SEROLOGIC RH(D): CPT

## 2022-12-17 ENCOUNTER — APPOINTMENT (OUTPATIENT)
Age: 72
End: 2022-12-17

## 2022-12-20 ENCOUNTER — OUTPATIENT (OUTPATIENT)
Dept: OUTPATIENT SERVICES | Facility: HOSPITAL | Age: 72
LOS: 1 days | Discharge: HOME | End: 2022-12-20

## 2022-12-20 DIAGNOSIS — C50.919 MALIGNANT NEOPLASM OF UNSPECIFIED SITE OF UNSPECIFIED FEMALE BREAST: ICD-10-CM

## 2022-12-22 ENCOUNTER — RESULT REVIEW (OUTPATIENT)
Age: 72
End: 2022-12-22

## 2022-12-22 ENCOUNTER — APPOINTMENT (OUTPATIENT)
Age: 72
End: 2022-12-22

## 2022-12-22 DIAGNOSIS — R11.2 NAUSEA WITH VOMITING, UNSPECIFIED: ICD-10-CM

## 2022-12-22 DIAGNOSIS — Z51.11 ENCOUNTER FOR ANTINEOPLASTIC CHEMOTHERAPY: ICD-10-CM

## 2022-12-22 LAB
ALBUMIN SERPL ELPH-MCNC: 3.7 G/DL — SIGNIFICANT CHANGE UP (ref 3.3–5)
ALP SERPL-CCNC: 76 U/L — SIGNIFICANT CHANGE UP (ref 40–120)
ALT FLD-CCNC: 24 U/L — SIGNIFICANT CHANGE UP (ref 10–45)
ANION GAP SERPL CALC-SCNC: 10 MMOL/L — SIGNIFICANT CHANGE UP (ref 5–17)
AST SERPL-CCNC: 34 U/L — SIGNIFICANT CHANGE UP (ref 10–40)
BASOPHILS # BLD AUTO: 0 K/UL — SIGNIFICANT CHANGE UP (ref 0–0.2)
BASOPHILS NFR BLD AUTO: 0 % — SIGNIFICANT CHANGE UP (ref 0–2)
BILIRUB SERPL-MCNC: 0.4 MG/DL — SIGNIFICANT CHANGE UP (ref 0.2–1.2)
BUN SERPL-MCNC: 12 MG/DL — SIGNIFICANT CHANGE UP (ref 7–23)
CALCIUM SERPL-MCNC: 9.8 MG/DL — SIGNIFICANT CHANGE UP (ref 8.4–10.5)
CHLORIDE SERPL-SCNC: 105 MMOL/L — SIGNIFICANT CHANGE UP (ref 96–108)
CO2 SERPL-SCNC: 26 MMOL/L — SIGNIFICANT CHANGE UP (ref 22–31)
CREAT SERPL-MCNC: 0.98 MG/DL — SIGNIFICANT CHANGE UP (ref 0.5–1.3)
EGFR: 61 ML/MIN/1.73M2 — SIGNIFICANT CHANGE UP
EOSINOPHIL # BLD AUTO: 0.08 K/UL — SIGNIFICANT CHANGE UP (ref 0–0.5)
EOSINOPHIL NFR BLD AUTO: 2 % — SIGNIFICANT CHANGE UP (ref 0–6)
GLUCOSE SERPL-MCNC: 91 MG/DL — SIGNIFICANT CHANGE UP (ref 70–99)
HCT VFR BLD CALC: 32.7 % — LOW (ref 34.5–45)
HGB BLD-MCNC: 10.9 G/DL — LOW (ref 11.5–15.5)
LYMPHOCYTES # BLD AUTO: 0.94 K/UL — LOW (ref 1–3.3)
LYMPHOCYTES # BLD AUTO: 24 % — SIGNIFICANT CHANGE UP (ref 13–44)
MCHC RBC-ENTMCNC: 30.8 PG — SIGNIFICANT CHANGE UP (ref 27–34)
MCHC RBC-ENTMCNC: 33.2 G/DL — SIGNIFICANT CHANGE UP (ref 32–36)
MCV RBC AUTO: 92.6 FL — SIGNIFICANT CHANGE UP (ref 80–100)
MONOCYTES # BLD AUTO: 0.74 K/UL — SIGNIFICANT CHANGE UP (ref 0–0.9)
MONOCYTES NFR BLD AUTO: 19 % — HIGH (ref 2–14)
NEUTROPHILS # BLD AUTO: 2.16 K/UL — SIGNIFICANT CHANGE UP (ref 1.8–7.4)
NEUTROPHILS NFR BLD AUTO: 55 % — SIGNIFICANT CHANGE UP (ref 43–77)
NRBC # BLD: 0 /100 — SIGNIFICANT CHANGE UP (ref 0–0)
NRBC # BLD: SIGNIFICANT CHANGE UP /100 WBCS (ref 0–0)
PLAT MORPH BLD: NORMAL — SIGNIFICANT CHANGE UP
PLATELET # BLD AUTO: 146 K/UL — LOW (ref 150–400)
POTASSIUM SERPL-MCNC: 4 MMOL/L — SIGNIFICANT CHANGE UP (ref 3.5–5.3)
POTASSIUM SERPL-SCNC: 4 MMOL/L — SIGNIFICANT CHANGE UP (ref 3.5–5.3)
PROT SERPL-MCNC: 6.7 G/DL — SIGNIFICANT CHANGE UP (ref 6–8.3)
RBC # BLD: 3.51 M/UL — LOW (ref 3.8–5.2)
RBC # FLD: 18.6 % — HIGH (ref 10.3–14.5)
RBC BLD AUTO: SIGNIFICANT CHANGE UP
SODIUM SERPL-SCNC: 141 MMOL/L — SIGNIFICANT CHANGE UP (ref 135–145)
WBC # BLD: 3.92 K/UL — SIGNIFICANT CHANGE UP (ref 3.8–10.5)
WBC # FLD AUTO: 3.92 K/UL — SIGNIFICANT CHANGE UP (ref 3.8–10.5)

## 2022-12-29 ENCOUNTER — RESULT REVIEW (OUTPATIENT)
Age: 72
End: 2022-12-29

## 2022-12-29 ENCOUNTER — APPOINTMENT (OUTPATIENT)
Age: 72
End: 2022-12-29

## 2022-12-29 LAB
BASOPHILS # BLD AUTO: 0.02 K/UL — SIGNIFICANT CHANGE UP (ref 0–0.2)
BASOPHILS NFR BLD AUTO: 1.5 % — SIGNIFICANT CHANGE UP (ref 0–2)
EOSINOPHIL # BLD AUTO: 0.04 K/UL — SIGNIFICANT CHANGE UP (ref 0–0.5)
EOSINOPHIL NFR BLD AUTO: 2.9 % — SIGNIFICANT CHANGE UP (ref 0–6)
HCT VFR BLD CALC: 30 % — LOW (ref 34.5–45)
HGB BLD-MCNC: 9.9 G/DL — LOW (ref 11.5–15.5)
IMM GRANULOCYTES NFR BLD AUTO: 0.7 % — SIGNIFICANT CHANGE UP (ref 0–0.9)
LYMPHOCYTES # BLD AUTO: 0.68 K/UL — LOW (ref 1–3.3)
LYMPHOCYTES # BLD AUTO: 50 % — HIGH (ref 13–44)
MCHC RBC-ENTMCNC: 30.7 PG — SIGNIFICANT CHANGE UP (ref 27–34)
MCHC RBC-ENTMCNC: 33 G/DL — SIGNIFICANT CHANGE UP (ref 32–36)
MCV RBC AUTO: 93.2 FL — SIGNIFICANT CHANGE UP (ref 80–100)
MONOCYTES # BLD AUTO: 0.16 K/UL — SIGNIFICANT CHANGE UP (ref 0–0.9)
MONOCYTES NFR BLD AUTO: 11.8 % — SIGNIFICANT CHANGE UP (ref 2–14)
NEUTROPHILS # BLD AUTO: 0.45 K/UL — LOW (ref 1.8–7.4)
NEUTROPHILS NFR BLD AUTO: 33.1 % — LOW (ref 43–77)
NRBC # BLD: 0 /100 WBCS — SIGNIFICANT CHANGE UP (ref 0–0)
PLATELET # BLD AUTO: 96 K/UL — LOW (ref 150–400)
RBC # BLD: 3.22 M/UL — LOW (ref 3.8–5.2)
RBC # FLD: 17.2 % — HIGH (ref 10.3–14.5)
WBC # BLD: 1.36 K/UL — LOW (ref 3.8–10.5)
WBC # FLD AUTO: 1.36 K/UL — LOW (ref 3.8–10.5)

## 2023-01-05 ENCOUNTER — APPOINTMENT (OUTPATIENT)
Age: 73
End: 2023-01-05

## 2023-01-05 ENCOUNTER — RESULT REVIEW (OUTPATIENT)
Age: 73
End: 2023-01-05

## 2023-01-05 LAB
BASOPHILS # BLD AUTO: 0 K/UL — SIGNIFICANT CHANGE UP (ref 0–0.2)
BASOPHILS NFR BLD AUTO: 0 % — SIGNIFICANT CHANGE UP (ref 0–2)
EOSINOPHIL # BLD AUTO: 0.03 K/UL — SIGNIFICANT CHANGE UP (ref 0–0.5)
EOSINOPHIL NFR BLD AUTO: 1 % — SIGNIFICANT CHANGE UP (ref 0–6)
HCT VFR BLD CALC: 30.3 % — LOW (ref 34.5–45)
HGB BLD-MCNC: 10.3 G/DL — LOW (ref 11.5–15.5)
LYMPHOCYTES # BLD AUTO: 0.93 K/UL — LOW (ref 1–3.3)
LYMPHOCYTES # BLD AUTO: 36 % — SIGNIFICANT CHANGE UP (ref 13–44)
MCHC RBC-ENTMCNC: 31.5 PG — SIGNIFICANT CHANGE UP (ref 27–34)
MCHC RBC-ENTMCNC: 34 G/DL — SIGNIFICANT CHANGE UP (ref 32–36)
MCV RBC AUTO: 92.7 FL — SIGNIFICANT CHANGE UP (ref 80–100)
MONOCYTES # BLD AUTO: 0.83 K/UL — SIGNIFICANT CHANGE UP (ref 0–0.9)
MONOCYTES NFR BLD AUTO: 32 % — HIGH (ref 2–14)
MYELOCYTES NFR BLD: 1 % — HIGH (ref 0–0)
NEUTROPHILS # BLD AUTO: 0.78 K/UL — LOW (ref 1.8–7.4)
NEUTROPHILS NFR BLD AUTO: 30 % — LOW (ref 43–77)
NRBC # BLD: 0 /100 — SIGNIFICANT CHANGE UP (ref 0–0)
NRBC # BLD: SIGNIFICANT CHANGE UP /100 WBCS (ref 0–0)
PLAT MORPH BLD: NORMAL — SIGNIFICANT CHANGE UP
PLATELET # BLD AUTO: 148 K/UL — LOW (ref 150–400)
RBC # BLD: 3.27 M/UL — LOW (ref 3.8–5.2)
RBC # FLD: 17.2 % — HIGH (ref 10.3–14.5)
RBC BLD AUTO: SIGNIFICANT CHANGE UP
WBC # BLD: 2.59 K/UL — LOW (ref 3.8–10.5)
WBC # FLD AUTO: 2.59 K/UL — LOW (ref 3.8–10.5)

## 2023-01-12 ENCOUNTER — APPOINTMENT (OUTPATIENT)
Age: 73
End: 2023-01-12

## 2023-01-12 ENCOUNTER — RESULT REVIEW (OUTPATIENT)
Age: 73
End: 2023-01-12

## 2023-01-12 ENCOUNTER — NON-APPOINTMENT (OUTPATIENT)
Age: 73
End: 2023-01-12

## 2023-01-12 LAB
BASOPHILS # BLD AUTO: 0.02 K/UL — SIGNIFICANT CHANGE UP (ref 0–0.2)
BASOPHILS NFR BLD AUTO: 0.4 % — SIGNIFICANT CHANGE UP (ref 0–2)
EOSINOPHIL # BLD AUTO: 0.07 K/UL — SIGNIFICANT CHANGE UP (ref 0–0.5)
EOSINOPHIL NFR BLD AUTO: 1.3 % — SIGNIFICANT CHANGE UP (ref 0–6)
HCT VFR BLD CALC: 31.1 % — LOW (ref 34.5–45)
HGB BLD-MCNC: 10.4 G/DL — LOW (ref 11.5–15.5)
IMM GRANULOCYTES NFR BLD AUTO: 0.8 % — SIGNIFICANT CHANGE UP (ref 0–0.9)
LYMPHOCYTES # BLD AUTO: 1.43 K/UL — SIGNIFICANT CHANGE UP (ref 1–3.3)
LYMPHOCYTES # BLD AUTO: 26.8 % — SIGNIFICANT CHANGE UP (ref 13–44)
MCHC RBC-ENTMCNC: 31.2 PG — SIGNIFICANT CHANGE UP (ref 27–34)
MCHC RBC-ENTMCNC: 33.4 G/DL — SIGNIFICANT CHANGE UP (ref 32–36)
MCV RBC AUTO: 93.4 FL — SIGNIFICANT CHANGE UP (ref 80–100)
MONOCYTES # BLD AUTO: 0.84 K/UL — SIGNIFICANT CHANGE UP (ref 0–0.9)
MONOCYTES NFR BLD AUTO: 15.8 % — HIGH (ref 2–14)
NEUTROPHILS # BLD AUTO: 2.93 K/UL — SIGNIFICANT CHANGE UP (ref 1.8–7.4)
NEUTROPHILS NFR BLD AUTO: 54.9 % — SIGNIFICANT CHANGE UP (ref 43–77)
NRBC # BLD: 0 /100 WBCS — SIGNIFICANT CHANGE UP (ref 0–0)
PLATELET # BLD AUTO: 144 K/UL — LOW (ref 150–400)
RBC # BLD: 3.33 M/UL — LOW (ref 3.8–5.2)
RBC # FLD: 17.2 % — HIGH (ref 10.3–14.5)
WBC # BLD: 5.33 K/UL — SIGNIFICANT CHANGE UP (ref 3.8–10.5)
WBC # FLD AUTO: 5.33 K/UL — SIGNIFICANT CHANGE UP (ref 3.8–10.5)

## 2023-01-13 LAB
ALBUMIN SERPL ELPH-MCNC: 3.9 G/DL — SIGNIFICANT CHANGE UP (ref 3.3–5)
ALP SERPL-CCNC: 82 U/L — SIGNIFICANT CHANGE UP (ref 40–120)
ALT FLD-CCNC: 18 U/L — SIGNIFICANT CHANGE UP (ref 10–45)
ANION GAP SERPL CALC-SCNC: 12 MMOL/L — SIGNIFICANT CHANGE UP (ref 5–17)
AST SERPL-CCNC: 22 U/L — SIGNIFICANT CHANGE UP (ref 10–40)
BILIRUB SERPL-MCNC: 0.3 MG/DL — SIGNIFICANT CHANGE UP (ref 0.2–1.2)
BUN SERPL-MCNC: 11 MG/DL — SIGNIFICANT CHANGE UP (ref 7–23)
CALCIUM SERPL-MCNC: 9.8 MG/DL — SIGNIFICANT CHANGE UP (ref 8.4–10.5)
CHLORIDE SERPL-SCNC: 102 MMOL/L — SIGNIFICANT CHANGE UP (ref 96–108)
CO2 SERPL-SCNC: 26 MMOL/L — SIGNIFICANT CHANGE UP (ref 22–31)
CREAT SERPL-MCNC: 1.03 MG/DL — SIGNIFICANT CHANGE UP (ref 0.5–1.3)
EGFR: 57 ML/MIN/1.73M2 — LOW
GLUCOSE SERPL-MCNC: 94 MG/DL — SIGNIFICANT CHANGE UP (ref 70–99)
POTASSIUM SERPL-MCNC: 4.2 MMOL/L — SIGNIFICANT CHANGE UP (ref 3.5–5.3)
POTASSIUM SERPL-SCNC: 4.2 MMOL/L — SIGNIFICANT CHANGE UP (ref 3.5–5.3)
PROT SERPL-MCNC: 7.1 G/DL — SIGNIFICANT CHANGE UP (ref 6–8.3)
SODIUM SERPL-SCNC: 140 MMOL/L — SIGNIFICANT CHANGE UP (ref 135–145)

## 2023-01-19 ENCOUNTER — RESULT REVIEW (OUTPATIENT)
Age: 73
End: 2023-01-19

## 2023-01-19 ENCOUNTER — APPOINTMENT (OUTPATIENT)
Age: 73
End: 2023-01-19

## 2023-01-19 LAB
BASOPHILS # BLD AUTO: 0.03 K/UL — SIGNIFICANT CHANGE UP (ref 0–0.2)
BASOPHILS NFR BLD AUTO: 1.4 % — SIGNIFICANT CHANGE UP (ref 0–2)
EOSINOPHIL # BLD AUTO: 0.07 K/UL — SIGNIFICANT CHANGE UP (ref 0–0.5)
EOSINOPHIL NFR BLD AUTO: 3.3 % — SIGNIFICANT CHANGE UP (ref 0–6)
HCT VFR BLD CALC: 31 % — LOW (ref 34.5–45)
HGB BLD-MCNC: 10.4 G/DL — LOW (ref 11.5–15.5)
IMM GRANULOCYTES NFR BLD AUTO: 0.9 % — SIGNIFICANT CHANGE UP (ref 0–0.9)
LYMPHOCYTES # BLD AUTO: 0.79 K/UL — LOW (ref 1–3.3)
LYMPHOCYTES # BLD AUTO: 36.7 % — SIGNIFICANT CHANGE UP (ref 13–44)
MCHC RBC-ENTMCNC: 31.4 PG — SIGNIFICANT CHANGE UP (ref 27–34)
MCHC RBC-ENTMCNC: 33.5 G/DL — SIGNIFICANT CHANGE UP (ref 32–36)
MCV RBC AUTO: 93.7 FL — SIGNIFICANT CHANGE UP (ref 80–100)
MONOCYTES # BLD AUTO: 0.31 K/UL — SIGNIFICANT CHANGE UP (ref 0–0.9)
MONOCYTES NFR BLD AUTO: 14.4 % — HIGH (ref 2–14)
NEUTROPHILS # BLD AUTO: 0.93 K/UL — LOW (ref 1.8–7.4)
NEUTROPHILS NFR BLD AUTO: 43.3 % — SIGNIFICANT CHANGE UP (ref 43–77)
NRBC # BLD: 0 /100 WBCS — SIGNIFICANT CHANGE UP (ref 0–0)
PLATELET # BLD AUTO: 126 K/UL — LOW (ref 150–400)
RBC # BLD: 3.31 M/UL — LOW (ref 3.8–5.2)
RBC # FLD: 16.7 % — HIGH (ref 10.3–14.5)
WBC # BLD: 2.15 K/UL — LOW (ref 3.8–10.5)
WBC # FLD AUTO: 2.15 K/UL — LOW (ref 3.8–10.5)

## 2023-01-26 ENCOUNTER — RESULT REVIEW (OUTPATIENT)
Age: 73
End: 2023-01-26

## 2023-01-26 ENCOUNTER — APPOINTMENT (OUTPATIENT)
Age: 73
End: 2023-01-26

## 2023-01-26 LAB
ALBUMIN SERPL ELPH-MCNC: 4 G/DL — SIGNIFICANT CHANGE UP (ref 3.3–5)
ALP SERPL-CCNC: 77 U/L — SIGNIFICANT CHANGE UP (ref 40–120)
ALT FLD-CCNC: 16 U/L — SIGNIFICANT CHANGE UP (ref 10–45)
ANION GAP SERPL CALC-SCNC: 10 MMOL/L — SIGNIFICANT CHANGE UP (ref 5–17)
AST SERPL-CCNC: 23 U/L — SIGNIFICANT CHANGE UP (ref 10–40)
BASOPHILS # BLD AUTO: 0.02 K/UL — SIGNIFICANT CHANGE UP (ref 0–0.2)
BASOPHILS NFR BLD AUTO: 0.5 % — SIGNIFICANT CHANGE UP (ref 0–2)
BILIRUB SERPL-MCNC: 0.4 MG/DL — SIGNIFICANT CHANGE UP (ref 0.2–1.2)
BUN SERPL-MCNC: 20 MG/DL — SIGNIFICANT CHANGE UP (ref 7–23)
CALCIUM SERPL-MCNC: 9.9 MG/DL — SIGNIFICANT CHANGE UP (ref 8.4–10.5)
CHLORIDE SERPL-SCNC: 104 MMOL/L — SIGNIFICANT CHANGE UP (ref 96–108)
CREAT SERPL-MCNC: 0.99 MG/DL — SIGNIFICANT CHANGE UP (ref 0.5–1.3)
EGFR: 59 ML/MIN/1.73M2 — LOW
EOSINOPHIL # BLD AUTO: 0.07 K/UL — SIGNIFICANT CHANGE UP (ref 0–0.5)
EOSINOPHIL NFR BLD AUTO: 1.8 % — SIGNIFICANT CHANGE UP (ref 0–6)
GLUCOSE SERPL-MCNC: 89 MG/DL — SIGNIFICANT CHANGE UP (ref 70–99)
HCT VFR BLD CALC: 26.8 % — LOW (ref 34.5–45)
HGB BLD-MCNC: 9.2 G/DL — LOW (ref 11.5–15.5)
IMM GRANULOCYTES NFR BLD AUTO: 0.5 % — SIGNIFICANT CHANGE UP (ref 0–0.9)
LYMPHOCYTES # BLD AUTO: 1.23 K/UL — SIGNIFICANT CHANGE UP (ref 1–3.3)
LYMPHOCYTES # BLD AUTO: 31.9 % — SIGNIFICANT CHANGE UP (ref 13–44)
MCHC RBC-ENTMCNC: 32.1 PG — SIGNIFICANT CHANGE UP (ref 27–34)
MCHC RBC-ENTMCNC: 34.3 G/DL — SIGNIFICANT CHANGE UP (ref 32–36)
MCV RBC AUTO: 93.4 FL — SIGNIFICANT CHANGE UP (ref 80–100)
MONOCYTES # BLD AUTO: 0.74 K/UL — SIGNIFICANT CHANGE UP (ref 0–0.9)
MONOCYTES NFR BLD AUTO: 19.2 % — HIGH (ref 2–14)
NEUTROPHILS # BLD AUTO: 1.78 K/UL — LOW (ref 1.8–7.4)
NEUTROPHILS NFR BLD AUTO: 46.1 % — SIGNIFICANT CHANGE UP (ref 43–77)
NRBC # BLD: 0 /100 WBCS — SIGNIFICANT CHANGE UP (ref 0–0)
PLATELET # BLD AUTO: 155 K/UL — SIGNIFICANT CHANGE UP (ref 150–400)
POTASSIUM SERPL-MCNC: 3.9 MMOL/L — SIGNIFICANT CHANGE UP (ref 3.5–5.3)
POTASSIUM SERPL-SCNC: 3.9 MMOL/L — SIGNIFICANT CHANGE UP (ref 3.5–5.3)
PROT SERPL-MCNC: 7 G/DL — SIGNIFICANT CHANGE UP (ref 6–8.3)
RBC # BLD: 2.87 M/UL — LOW (ref 3.8–5.2)
RBC # FLD: 17.2 % — HIGH (ref 10.3–14.5)
SODIUM SERPL-SCNC: 141 MMOL/L — SIGNIFICANT CHANGE UP (ref 135–145)
WBC # BLD: 3.86 K/UL — SIGNIFICANT CHANGE UP (ref 3.8–10.5)
WBC # FLD AUTO: 3.86 K/UL — SIGNIFICANT CHANGE UP (ref 3.8–10.5)

## 2023-01-27 LAB — CO2 SERPL-SCNC: 18 MMOL/L — LOW (ref 22–31)

## 2023-02-02 ENCOUNTER — RESULT REVIEW (OUTPATIENT)
Age: 73
End: 2023-02-02

## 2023-02-02 ENCOUNTER — APPOINTMENT (OUTPATIENT)
Age: 73
End: 2023-02-02

## 2023-02-02 DIAGNOSIS — Z51.89 ENCOUNTER FOR OTHER SPECIFIED AFTERCARE: ICD-10-CM

## 2023-02-02 LAB
BASOPHILS # BLD AUTO: 0.03 K/UL — SIGNIFICANT CHANGE UP (ref 0–0.2)
BASOPHILS NFR BLD AUTO: 0.9 % — SIGNIFICANT CHANGE UP (ref 0–2)
EOSINOPHIL # BLD AUTO: 0.07 K/UL — SIGNIFICANT CHANGE UP (ref 0–0.5)
EOSINOPHIL NFR BLD AUTO: 2.1 % — SIGNIFICANT CHANGE UP (ref 0–6)
HCT VFR BLD CALC: 28.8 % — LOW (ref 34.5–45)
HGB BLD-MCNC: 9.8 G/DL — LOW (ref 11.5–15.5)
IMM GRANULOCYTES NFR BLD AUTO: 1.5 % — HIGH (ref 0–0.9)
LYMPHOCYTES # BLD AUTO: 0.98 K/UL — LOW (ref 1–3.3)
LYMPHOCYTES # BLD AUTO: 29.7 % — SIGNIFICANT CHANGE UP (ref 13–44)
MCHC RBC-ENTMCNC: 31.9 PG — SIGNIFICANT CHANGE UP (ref 27–34)
MCHC RBC-ENTMCNC: 34 G/DL — SIGNIFICANT CHANGE UP (ref 32–36)
MCV RBC AUTO: 93.8 FL — SIGNIFICANT CHANGE UP (ref 80–100)
MONOCYTES # BLD AUTO: 0.49 K/UL — SIGNIFICANT CHANGE UP (ref 0–0.9)
MONOCYTES NFR BLD AUTO: 14.8 % — HIGH (ref 2–14)
NEUTROPHILS # BLD AUTO: 1.68 K/UL — LOW (ref 1.8–7.4)
NEUTROPHILS NFR BLD AUTO: 51 % — SIGNIFICANT CHANGE UP (ref 43–77)
NRBC # BLD: 0 /100 WBCS — SIGNIFICANT CHANGE UP (ref 0–0)
PLATELET # BLD AUTO: 144 K/UL — LOW (ref 150–400)
RBC # BLD: 3.07 M/UL — LOW (ref 3.8–5.2)
RBC # FLD: 17 % — HIGH (ref 10.3–14.5)
WBC # BLD: 3.3 K/UL — LOW (ref 3.8–10.5)
WBC # FLD AUTO: 3.3 K/UL — LOW (ref 3.8–10.5)

## 2023-02-03 LAB
ALBUMIN SERPL ELPH-MCNC: 4.1 G/DL — SIGNIFICANT CHANGE UP (ref 3.3–5)
ALP SERPL-CCNC: 80 U/L — SIGNIFICANT CHANGE UP (ref 40–120)
ALT FLD-CCNC: 23 U/L — SIGNIFICANT CHANGE UP (ref 10–45)
ANION GAP SERPL CALC-SCNC: 17 MMOL/L — SIGNIFICANT CHANGE UP (ref 5–17)
AST SERPL-CCNC: 28 U/L — SIGNIFICANT CHANGE UP (ref 10–40)
BILIRUB SERPL-MCNC: 0.2 MG/DL — SIGNIFICANT CHANGE UP (ref 0.2–1.2)
BUN SERPL-MCNC: 19 MG/DL — SIGNIFICANT CHANGE UP (ref 7–23)
CALCIUM SERPL-MCNC: 10 MG/DL — SIGNIFICANT CHANGE UP (ref 8.4–10.5)
CHLORIDE SERPL-SCNC: 104 MMOL/L — SIGNIFICANT CHANGE UP (ref 96–108)
CO2 SERPL-SCNC: 22 MMOL/L — SIGNIFICANT CHANGE UP (ref 22–31)
CREAT SERPL-MCNC: 1.22 MG/DL — SIGNIFICANT CHANGE UP (ref 0.5–1.3)
EGFR: 46 ML/MIN/1.73M2 — LOW
GLUCOSE SERPL-MCNC: 112 MG/DL — HIGH (ref 70–99)
POTASSIUM SERPL-MCNC: 4.2 MMOL/L — SIGNIFICANT CHANGE UP (ref 3.5–5.3)
POTASSIUM SERPL-SCNC: 4.2 MMOL/L — SIGNIFICANT CHANGE UP (ref 3.5–5.3)
PROT SERPL-MCNC: 7.3 G/DL — SIGNIFICANT CHANGE UP (ref 6–8.3)
SODIUM SERPL-SCNC: 143 MMOL/L — SIGNIFICANT CHANGE UP (ref 135–145)

## 2023-02-09 ENCOUNTER — RESULT REVIEW (OUTPATIENT)
Age: 73
End: 2023-02-09

## 2023-02-09 ENCOUNTER — APPOINTMENT (OUTPATIENT)
Age: 73
End: 2023-02-09

## 2023-02-09 LAB
ANISOCYTOSIS BLD QL: SLIGHT — SIGNIFICANT CHANGE UP
BASOPHILS # BLD AUTO: 0.02 K/UL — SIGNIFICANT CHANGE UP (ref 0–0.2)
BASOPHILS NFR BLD AUTO: 0.7 % — SIGNIFICANT CHANGE UP (ref 0–2)
ELLIPTOCYTES BLD QL SMEAR: SLIGHT — SIGNIFICANT CHANGE UP
EOSINOPHIL # BLD AUTO: 0.07 K/UL — SIGNIFICANT CHANGE UP (ref 0–0.5)
EOSINOPHIL NFR BLD AUTO: 2.5 % — SIGNIFICANT CHANGE UP (ref 0–6)
HCT VFR BLD CALC: 28.7 % — LOW (ref 34.5–45)
HGB BLD-MCNC: 9.7 G/DL — LOW (ref 11.5–15.5)
IMM GRANULOCYTES NFR BLD AUTO: 1.1 % — HIGH (ref 0–0.9)
LYMPHOCYTES # BLD AUTO: 0.92 K/UL — LOW (ref 1–3.3)
LYMPHOCYTES # BLD AUTO: 33.2 % — SIGNIFICANT CHANGE UP (ref 13–44)
MCHC RBC-ENTMCNC: 31.8 PG — SIGNIFICANT CHANGE UP (ref 27–34)
MCHC RBC-ENTMCNC: 33.8 G/DL — SIGNIFICANT CHANGE UP (ref 32–36)
MCV RBC AUTO: 94.1 FL — SIGNIFICANT CHANGE UP (ref 80–100)
MONOCYTES # BLD AUTO: 0.47 K/UL — SIGNIFICANT CHANGE UP (ref 0–0.9)
MONOCYTES NFR BLD AUTO: 17 % — HIGH (ref 2–14)
NEUTROPHILS # BLD AUTO: 1.26 K/UL — LOW (ref 1.8–7.4)
NEUTROPHILS NFR BLD AUTO: 45.5 % — SIGNIFICANT CHANGE UP (ref 43–77)
NRBC # BLD: 0 /100 WBCS — SIGNIFICANT CHANGE UP (ref 0–0)
PLAT MORPH BLD: NORMAL — SIGNIFICANT CHANGE UP
PLATELET # BLD AUTO: 142 K/UL — LOW (ref 150–400)
POIKILOCYTOSIS BLD QL AUTO: SLIGHT — SIGNIFICANT CHANGE UP
RBC # BLD: 3.05 M/UL — LOW (ref 3.8–5.2)
RBC # FLD: 16.9 % — HIGH (ref 10.3–14.5)
RBC BLD AUTO: ABNORMAL
WBC # BLD: 2.77 K/UL — LOW (ref 3.8–10.5)
WBC # FLD AUTO: 2.77 K/UL — LOW (ref 3.8–10.5)

## 2023-02-10 ENCOUNTER — APPOINTMENT (OUTPATIENT)
Age: 73
End: 2023-02-10

## 2023-02-11 ENCOUNTER — APPOINTMENT (OUTPATIENT)
Age: 73
End: 2023-02-11

## 2023-02-13 ENCOUNTER — APPOINTMENT (OUTPATIENT)
Age: 73
End: 2023-02-13

## 2023-02-14 ENCOUNTER — OUTPATIENT (OUTPATIENT)
Dept: OUTPATIENT SERVICES | Facility: HOSPITAL | Age: 73
LOS: 1 days | Discharge: HOME | End: 2023-02-14

## 2023-02-14 DIAGNOSIS — C50.919 MALIGNANT NEOPLASM OF UNSPECIFIED SITE OF UNSPECIFIED FEMALE BREAST: ICD-10-CM

## 2023-02-16 ENCOUNTER — RESULT REVIEW (OUTPATIENT)
Age: 73
End: 2023-02-16

## 2023-02-16 ENCOUNTER — APPOINTMENT (OUTPATIENT)
Age: 73
End: 2023-02-16

## 2023-02-16 LAB
BASOPHILS # BLD AUTO: 0 K/UL — SIGNIFICANT CHANGE UP (ref 0–0.2)
BASOPHILS NFR BLD AUTO: 0 % — SIGNIFICANT CHANGE UP (ref 0–2)
EOSINOPHIL # BLD AUTO: 0.08 K/UL — SIGNIFICANT CHANGE UP (ref 0–0.5)
EOSINOPHIL NFR BLD AUTO: 1 % — SIGNIFICANT CHANGE UP (ref 0–6)
HCT VFR BLD CALC: 29.1 % — LOW (ref 34.5–45)
HGB BLD-MCNC: 9.7 G/DL — LOW (ref 11.5–15.5)
LYMPHOCYTES # BLD AUTO: 1.65 K/UL — SIGNIFICANT CHANGE UP (ref 1–3.3)
LYMPHOCYTES # BLD AUTO: 21 % — SIGNIFICANT CHANGE UP (ref 13–44)
MCHC RBC-ENTMCNC: 31.9 PG — SIGNIFICANT CHANGE UP (ref 27–34)
MCHC RBC-ENTMCNC: 33.3 G/DL — SIGNIFICANT CHANGE UP (ref 32–36)
MCV RBC AUTO: 95.7 FL — SIGNIFICANT CHANGE UP (ref 80–100)
MONOCYTES # BLD AUTO: 1.5 K/UL — HIGH (ref 0–0.9)
MONOCYTES NFR BLD AUTO: 19 % — HIGH (ref 2–14)
MYELOCYTES NFR BLD: 2 % — HIGH (ref 0–0)
NEUTROPHILS # BLD AUTO: 4.41 K/UL — SIGNIFICANT CHANGE UP (ref 1.8–7.4)
NEUTROPHILS NFR BLD AUTO: 56 % — SIGNIFICANT CHANGE UP (ref 43–77)
NRBC # BLD: 0 /100 — SIGNIFICANT CHANGE UP (ref 0–0)
NRBC # BLD: SIGNIFICANT CHANGE UP /100 WBCS (ref 0–0)
PLAT MORPH BLD: NORMAL — SIGNIFICANT CHANGE UP
PLATELET # BLD AUTO: 143 K/UL — LOW (ref 150–400)
PROMYELOCYTES # FLD: 1 % — HIGH (ref 0–0)
RBC # BLD: 3.04 M/UL — LOW (ref 3.8–5.2)
RBC # FLD: 18.2 % — HIGH (ref 10.3–14.5)
RBC BLD AUTO: SIGNIFICANT CHANGE UP
WBC # BLD: 7.87 K/UL — SIGNIFICANT CHANGE UP (ref 3.8–10.5)
WBC # FLD AUTO: 7.87 K/UL — SIGNIFICANT CHANGE UP (ref 3.8–10.5)

## 2023-02-17 ENCOUNTER — APPOINTMENT (OUTPATIENT)
Age: 73
End: 2023-02-17

## 2023-02-17 DIAGNOSIS — Z51.11 ENCOUNTER FOR ANTINEOPLASTIC CHEMOTHERAPY: ICD-10-CM

## 2023-02-17 DIAGNOSIS — R11.2 NAUSEA WITH VOMITING, UNSPECIFIED: ICD-10-CM

## 2023-02-17 DIAGNOSIS — Z51.89 ENCOUNTER FOR OTHER SPECIFIED AFTERCARE: ICD-10-CM

## 2023-02-23 ENCOUNTER — RESULT REVIEW (OUTPATIENT)
Age: 73
End: 2023-02-23

## 2023-02-23 ENCOUNTER — APPOINTMENT (OUTPATIENT)
Age: 73
End: 2023-02-23

## 2023-02-23 ENCOUNTER — NON-APPOINTMENT (OUTPATIENT)
Age: 73
End: 2023-02-23

## 2023-02-23 LAB
BASOPHILS # BLD AUTO: 0.02 K/UL — SIGNIFICANT CHANGE UP (ref 0–0.2)
BASOPHILS NFR BLD AUTO: 1.1 % — SIGNIFICANT CHANGE UP (ref 0–2)
BUN SERPL-MCNC: 16 MG/DL — SIGNIFICANT CHANGE UP (ref 7–23)
CA-I BLDA-SCNC: 1.25 MMOL/L — SIGNIFICANT CHANGE UP (ref 1.12–1.3)
CHLORIDE SERPL-SCNC: 102 MMOL/L — SIGNIFICANT CHANGE UP (ref 96–108)
CO2 SERPL-SCNC: 28 MMOL/L — SIGNIFICANT CHANGE UP (ref 22–31)
CREAT SERPL-MCNC: 0.9 MG/DL — SIGNIFICANT CHANGE UP (ref 0.5–1.3)
EOSINOPHIL # BLD AUTO: 0.05 K/UL — SIGNIFICANT CHANGE UP (ref 0–0.5)
EOSINOPHIL NFR BLD AUTO: 2.7 % — SIGNIFICANT CHANGE UP (ref 0–6)
GLUCOSE SERPL-MCNC: 116 MG/DL — HIGH (ref 70–99)
HCT VFR BLD CALC: 27.8 % — LOW (ref 34.5–45)
HGB BLD-MCNC: 9.3 G/DL — LOW (ref 11.5–15.5)
IMM GRANULOCYTES NFR BLD AUTO: 0.5 % — SIGNIFICANT CHANGE UP (ref 0–0.9)
LYMPHOCYTES # BLD AUTO: 0.83 K/UL — LOW (ref 1–3.3)
LYMPHOCYTES # BLD AUTO: 44.6 % — HIGH (ref 13–44)
MCHC RBC-ENTMCNC: 32 PG — SIGNIFICANT CHANGE UP (ref 27–34)
MCHC RBC-ENTMCNC: 33.5 G/DL — SIGNIFICANT CHANGE UP (ref 32–36)
MCV RBC AUTO: 95.5 FL — SIGNIFICANT CHANGE UP (ref 80–100)
MONOCYTES # BLD AUTO: 0.43 K/UL — SIGNIFICANT CHANGE UP (ref 0–0.9)
MONOCYTES NFR BLD AUTO: 23.1 % — HIGH (ref 2–14)
NEUTROPHILS # BLD AUTO: 0.52 K/UL — LOW (ref 1.8–7.4)
NEUTROPHILS NFR BLD AUTO: 28 % — LOW (ref 43–77)
NRBC # BLD: 0 /100 WBCS — SIGNIFICANT CHANGE UP (ref 0–0)
PLATELET # BLD AUTO: 154 K/UL — SIGNIFICANT CHANGE UP (ref 150–400)
POTASSIUM SERPL-MCNC: 4.1 MMOL/L — SIGNIFICANT CHANGE UP (ref 3.5–5.3)
POTASSIUM SERPL-SCNC: 4.1 MMOL/L — SIGNIFICANT CHANGE UP (ref 3.5–5.3)
RBC # BLD: 2.91 M/UL — LOW (ref 3.8–5.2)
RBC # FLD: 17.7 % — HIGH (ref 10.3–14.5)
SODIUM SERPL-SCNC: 140 MMOL/L — SIGNIFICANT CHANGE UP (ref 135–145)
WBC # BLD: 1.86 K/UL — LOW (ref 3.8–10.5)
WBC # FLD AUTO: 1.86 K/UL — LOW (ref 3.8–10.5)

## 2023-02-24 ENCOUNTER — APPOINTMENT (OUTPATIENT)
Age: 73
End: 2023-02-24

## 2023-02-25 ENCOUNTER — APPOINTMENT (OUTPATIENT)
Age: 73
End: 2023-02-25

## 2023-03-02 ENCOUNTER — RESULT REVIEW (OUTPATIENT)
Age: 73
End: 2023-03-02

## 2023-03-02 ENCOUNTER — APPOINTMENT (OUTPATIENT)
Age: 73
End: 2023-03-02

## 2023-03-02 LAB
BASOPHILS # BLD AUTO: 0.03 K/UL — SIGNIFICANT CHANGE UP (ref 0–0.2)
BASOPHILS NFR BLD AUTO: 0.8 % — SIGNIFICANT CHANGE UP (ref 0–2)
EOSINOPHIL # BLD AUTO: 0.05 K/UL — SIGNIFICANT CHANGE UP (ref 0–0.5)
EOSINOPHIL NFR BLD AUTO: 1.3 % — SIGNIFICANT CHANGE UP (ref 0–6)
HCT VFR BLD CALC: 29.1 % — LOW (ref 34.5–45)
HGB BLD-MCNC: 9.8 G/DL — LOW (ref 11.5–15.5)
IMM GRANULOCYTES NFR BLD AUTO: 3.1 % — HIGH (ref 0–0.9)
LYMPHOCYTES # BLD AUTO: 1.35 K/UL — SIGNIFICANT CHANGE UP (ref 1–3.3)
LYMPHOCYTES # BLD AUTO: 35.2 % — SIGNIFICANT CHANGE UP (ref 13–44)
MCHC RBC-ENTMCNC: 32.6 PG — SIGNIFICANT CHANGE UP (ref 27–34)
MCHC RBC-ENTMCNC: 33.7 G/DL — SIGNIFICANT CHANGE UP (ref 32–36)
MCV RBC AUTO: 96.7 FL — SIGNIFICANT CHANGE UP (ref 80–100)
MONOCYTES # BLD AUTO: 1.1 K/UL — HIGH (ref 0–0.9)
MONOCYTES NFR BLD AUTO: 28.6 % — HIGH (ref 2–14)
NEUTROPHILS # BLD AUTO: 1.19 K/UL — LOW (ref 1.8–7.4)
NEUTROPHILS NFR BLD AUTO: 31 % — LOW (ref 43–77)
NRBC # BLD: 0 /100 WBCS — SIGNIFICANT CHANGE UP (ref 0–0)
PLATELET # BLD AUTO: 152 K/UL — SIGNIFICANT CHANGE UP (ref 150–400)
RBC # BLD: 3.01 M/UL — LOW (ref 3.8–5.2)
RBC # FLD: 18.1 % — HIGH (ref 10.3–14.5)
WBC # BLD: 3.84 K/UL — SIGNIFICANT CHANGE UP (ref 3.8–10.5)
WBC # FLD AUTO: 3.84 K/UL — SIGNIFICANT CHANGE UP (ref 3.8–10.5)

## 2023-03-03 ENCOUNTER — APPOINTMENT (OUTPATIENT)
Age: 73
End: 2023-03-03

## 2023-03-04 ENCOUNTER — APPOINTMENT (OUTPATIENT)
Age: 73
End: 2023-03-04

## 2023-03-09 ENCOUNTER — RESULT REVIEW (OUTPATIENT)
Age: 73
End: 2023-03-09

## 2023-03-09 ENCOUNTER — APPOINTMENT (OUTPATIENT)
Age: 73
End: 2023-03-09

## 2023-03-09 LAB
BASOPHILS # BLD AUTO: 0.02 K/UL — SIGNIFICANT CHANGE UP (ref 0–0.2)
BASOPHILS NFR BLD AUTO: 0.6 % — SIGNIFICANT CHANGE UP (ref 0–2)
EOSINOPHIL # BLD AUTO: 0.08 K/UL — SIGNIFICANT CHANGE UP (ref 0–0.5)
EOSINOPHIL NFR BLD AUTO: 2.6 % — SIGNIFICANT CHANGE UP (ref 0–6)
HCT VFR BLD CALC: 28.8 % — LOW (ref 34.5–45)
HGB BLD-MCNC: 9.7 G/DL — LOW (ref 11.5–15.5)
IMM GRANULOCYTES NFR BLD AUTO: 0.6 % — SIGNIFICANT CHANGE UP (ref 0–0.9)
LYMPHOCYTES # BLD AUTO: 1.22 K/UL — SIGNIFICANT CHANGE UP (ref 1–3.3)
LYMPHOCYTES # BLD AUTO: 39 % — SIGNIFICANT CHANGE UP (ref 13–44)
MCHC RBC-ENTMCNC: 32.2 PG — SIGNIFICANT CHANGE UP (ref 27–34)
MCHC RBC-ENTMCNC: 33.7 G/DL — SIGNIFICANT CHANGE UP (ref 32–36)
MCV RBC AUTO: 95.7 FL — SIGNIFICANT CHANGE UP (ref 80–100)
MONOCYTES # BLD AUTO: 0.79 K/UL — SIGNIFICANT CHANGE UP (ref 0–0.9)
MONOCYTES NFR BLD AUTO: 25.2 % — HIGH (ref 2–14)
NEUTROPHILS # BLD AUTO: 1 K/UL — LOW (ref 1.8–7.4)
NEUTROPHILS NFR BLD AUTO: 32 % — LOW (ref 43–77)
NRBC # BLD: 0 /100 WBCS — SIGNIFICANT CHANGE UP (ref 0–0)
PLATELET # BLD AUTO: 132 K/UL — LOW (ref 150–400)
RBC # BLD: 3.01 M/UL — LOW (ref 3.8–5.2)
RBC # FLD: 17.3 % — HIGH (ref 10.3–14.5)
WBC # BLD: 3.13 K/UL — LOW (ref 3.8–10.5)
WBC # FLD AUTO: 3.13 K/UL — LOW (ref 3.8–10.5)

## 2023-03-10 ENCOUNTER — APPOINTMENT (OUTPATIENT)
Age: 73
End: 2023-03-10

## 2023-03-11 ENCOUNTER — APPOINTMENT (OUTPATIENT)
Age: 73
End: 2023-03-11

## 2023-03-16 ENCOUNTER — APPOINTMENT (OUTPATIENT)
Age: 73
End: 2023-03-16

## 2023-03-16 ENCOUNTER — RESULT REVIEW (OUTPATIENT)
Age: 73
End: 2023-03-16

## 2023-03-16 LAB
BASOPHILS # BLD AUTO: 0.03 K/UL — SIGNIFICANT CHANGE UP (ref 0–0.2)
BASOPHILS NFR BLD AUTO: 1 % — SIGNIFICANT CHANGE UP (ref 0–2)
EOSINOPHIL # BLD AUTO: 0.09 K/UL — SIGNIFICANT CHANGE UP (ref 0–0.5)
EOSINOPHIL NFR BLD AUTO: 2.9 % — SIGNIFICANT CHANGE UP (ref 0–6)
HCT VFR BLD CALC: 29.6 % — LOW (ref 34.5–45)
HGB BLD-MCNC: 10.1 G/DL — LOW (ref 11.5–15.5)
IMM GRANULOCYTES NFR BLD AUTO: 1 % — HIGH (ref 0–0.9)
LYMPHOCYTES # BLD AUTO: 1.18 K/UL — SIGNIFICANT CHANGE UP (ref 1–3.3)
LYMPHOCYTES # BLD AUTO: 38.2 % — SIGNIFICANT CHANGE UP (ref 13–44)
MCHC RBC-ENTMCNC: 33.2 PG — SIGNIFICANT CHANGE UP (ref 27–34)
MCHC RBC-ENTMCNC: 34.1 G/DL — SIGNIFICANT CHANGE UP (ref 32–36)
MCV RBC AUTO: 97.4 FL — SIGNIFICANT CHANGE UP (ref 80–100)
MONOCYTES # BLD AUTO: 0.73 K/UL — SIGNIFICANT CHANGE UP (ref 0–0.9)
MONOCYTES NFR BLD AUTO: 23.6 % — HIGH (ref 2–14)
NEUTROPHILS # BLD AUTO: 1.03 K/UL — LOW (ref 1.8–7.4)
NEUTROPHILS NFR BLD AUTO: 33.3 % — LOW (ref 43–77)
NRBC # BLD: 0 /100 WBCS — SIGNIFICANT CHANGE UP (ref 0–0)
PLATELET # BLD AUTO: 180 K/UL — SIGNIFICANT CHANGE UP (ref 150–400)
RBC # BLD: 3.04 M/UL — LOW (ref 3.8–5.2)
RBC # FLD: 17.2 % — HIGH (ref 10.3–14.5)
WBC # BLD: 3.09 K/UL — LOW (ref 3.8–10.5)
WBC # FLD AUTO: 3.09 K/UL — LOW (ref 3.8–10.5)

## 2023-03-17 ENCOUNTER — APPOINTMENT (OUTPATIENT)
Age: 73
End: 2023-03-17

## 2023-03-18 ENCOUNTER — APPOINTMENT (OUTPATIENT)
Age: 73
End: 2023-03-18

## 2023-04-02 NOTE — PHYSICAL EXAM
[Restricted in physically strenuous activity but ambulatory and able to carry out work of a light or sedentary nature] : Status 1- Restricted in physically strenuous activity but ambulatory and able to carry out work of a light or sedentary nature, e.g., light house work, office work [Thrush] : thrush [Normal] : normal appearance, no rash, nodules, vesicles, ulcers, erythema [de-identified] : normal breast inspection and palpation of axillas . Nodularity along the right breast 9:00 location, but no discrete palpable mass.

## 2023-04-02 NOTE — REVIEW OF SYSTEMS
[Fatigue] : fatigue [Recent Change In Weight] : ~T recent weight change [Constipation] : constipation [Negative] : Psychiatric [FreeTextEntry2] : + alopecia; losing weight

## 2023-04-02 NOTE — ASSESSMENT
[FreeTextEntry1] : Ms. BALDERRAMA 's questions were answered to her satisfaction. \par She  expressed her  understanding and willingness to comply with the above recommendations, and  will return to the office in 1 month.\par \par

## 2023-04-02 NOTE — HISTORY OF PRESENT ILLNESS
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  Subcentimeter masses seen in the lower central and lower inner right breast N4–5 cm.  Questionable architectural distortion in the central left breast, N 6 cm seen on cc view.\par \par 5/3/2022 diagnostic mammography and breast ultrasound; 4 multiple spiculated masses seen in the right breast at 9:00, N 12-14 cm, highly suggestive of malignancy, for which US guided core biopsy recommended.\par \par 2022–US core biopsy right breast mass; pathology consistent with invasive, mixed poorly differentiated ductal/lobular carcinoma, ER, TN, HER2 negative.\par \par 2022: CT C/A/P–multiple, small retroperitoneal lymph nodes, considered mildly enlarged, nonspecific, chronic/reactive, less likely metastatic disease from breast cancer.\par \par 2022: Bilateral breast MRI: Conglomerate grouping of small irregular masses in the posterior lower outer right breast spanning 3.3 cm corresponding to the site of biopsy-proven malignancy with contiguous enhancing foci and branched non-mass enhancement extending an additional 5.5 cm anterior to the conglomerate masses, concerning for additional disease.  MRI guided biopsy of a more anterior aspect of this non-mass enhancement is suggested if breast conservation is considered.\par 0.7 cm ovoid enhancing mass in the lower outer right breast anterior depth, approximately 3.5 cm posterior to the nipple, indeterminate.  MRI guided biopsy recommended\par No suspicious enhancing findings in the left breast.  No lymphadenopathy.\par \par 2022–MRI guided vacuum-assisted core needle biopsy; pathology:\par RIGHT\par Site A (outer lower region):\par Invasive lobular carcinoma, classic type, Floyd score 6/9, invasive tumor present in several small foci, all measuring < 0.1 cm, LCIS classic type,\par Site B (anterior outer lower region):\par Intraductal papilloma with adjacent foci of columnar cell change, apocrine metaplasia, microcyst formation and simple adenosis.\par \par 2022: PET scan–preliminary report negative\par \par 7/15/22–[place]uapik-y-Remb placement left chest wall\par \par 22- cycle #1 AC/ pembrolizumab\par \par 10/20/2022- cycle #1 paclitaxel / pembrolizumab\par  [de-identified] : Moderately differentiated invasive lobular carcinoma [de-identified] : ER negative, [place] negative, HER2 negative [FreeTextEntry1] : Neoadjuvant systemic chemotherapy [de-identified] : Patient here to resume cycle #7 paclitaxel , overdue.  Previously palliative systemic therapy with held due to neutropenia (WBC 1.01K).\par Unfortunately her insurance does not cover growth factor, so today WBC 2.58K also makes her ineligible to resume chemotherapy.\par Patient is feeling cumulative toxic effect of therapy, with decreased appetite; since the start of therapy she is lost 22 pounds.\par Appears nontoxic; has complete alopecia.  Denies febrile neutropenia.\par Here accompanied by her cousin, Rosemarie.\par \par \par \par

## 2023-04-03 ENCOUNTER — APPOINTMENT (OUTPATIENT)
Age: 73
End: 2023-04-03
Payer: MEDICARE

## 2023-04-03 ENCOUNTER — OUTPATIENT (OUTPATIENT)
Dept: OUTPATIENT SERVICES | Facility: HOSPITAL | Age: 73
LOS: 1 days | Discharge: HOME | End: 2023-04-03

## 2023-04-03 VITALS
HEIGHT: 65.35 IN | SYSTOLIC BLOOD PRESSURE: 156 MMHG | HEART RATE: 70 BPM | TEMPERATURE: 98 F | WEIGHT: 155.43 LBS | BODY MASS INDEX: 25.58 KG/M2 | DIASTOLIC BLOOD PRESSURE: 79 MMHG | RESPIRATION RATE: 16 BRPM | OXYGEN SATURATION: 99 %

## 2023-04-03 DIAGNOSIS — C50.919 MALIGNANT NEOPLASM OF UNSPECIFIED SITE OF UNSPECIFIED FEMALE BREAST: ICD-10-CM

## 2023-04-03 PROCEDURE — 99214 OFFICE O/P EST MOD 30 MIN: CPT

## 2023-04-03 NOTE — REASON FOR VISIT
[Follow-Up Visit] : a follow-up [Spouse] : spouse [FreeTextEntry2] : triple negative right breast cancer

## 2023-04-03 NOTE — HISTORY OF PRESENT ILLNESS
[Disease: _____________________] : Disease: [unfilled] [de-identified] : 73F,originally from [place], with PMX HTN, hypothyroidism, s/p , referred for medical oncology consultation of right breast, ER/ID/HER2 negative mixed poorly differentiated ductal/lobular carcinoma.\par \par CASE SYNOPSIS:\par 3/30/22: Bilateral screening mammogram–spiculated masses/architectural distortion in the outer central right breast.  Subcentimeter masses seen in the lower central and lower inner right breast N4–5 cm.  Questionable architectural distortion in the central left breast, N 6 cm seen on cc view.\par \par 5/3/2022 diagnostic mammography and breast ultrasound; 4 multiple spiculated masses seen in the right breast at 9:00, N 12-14 cm, highly suggestive of malignancy, for which US guided core biopsy recommended.\par \par 2022–US core biopsy right breast mass; pathology consistent with invasive, mixed poorly differentiated ductal/lobular carcinoma, ER, ID, HER2 negative.\par \par 2022: CT C/A/P–multiple, small retroperitoneal lymph nodes, considered mildly enlarged, nonspecific, chronic/reactive, less likely metastatic disease from breast cancer.\par \par 2022: Bilateral breast MRI: Conglomerate grouping of small irregular masses in the posterior lower outer right breast spanning 3.3 cm corresponding to the site of biopsy-proven malignancy with contiguous enhancing foci and branched non-mass enhancement extending an additional 5.5 cm anterior to the conglomerate masses, concerning for additional disease.  MRI guided biopsy of a more anterior aspect of this non-mass enhancement is suggested if breast conservation is considered.\par 0.7 cm ovoid enhancing mass in the lower outer right breast anterior depth, approximately 3.5 cm posterior to the nipple, indeterminate.  MRI guided biopsy recommended\par No suspicious enhancing findings in the left breast.  No lymphadenopathy.\par \par 2022–MRI guided vacuum-assisted core needle biopsy; pathology:\par RIGHT\par Site A (outer lower region):\par Invasive lobular carcinoma, classic type, Floyd score 6/9, invasive tumor present in several small foci, all measuring < 0.1 cm, LCIS classic type,\par Site B (anterior outer lower region):\par Intraductal papilloma with adjacent foci of columnar cell change, apocrine metaplasia, microcyst formation and simple adenosis.\par \par 2022: PET scan–preliminary report negative\par \par 7/15/22–Tvofcx-o-Mruy placement left chest wall\par \par 22- cycle #1 AC/ pembrolizumab\par \par 10/20/2022- cycle #1 paclitaxel / pembrolizumab\par  [de-identified] : Moderately differentiated invasive lobular carcinoma [de-identified] : ER negative, CA negative, HER2 negative [FreeTextEntry1] : Neoadjuvant systemic chemotherapy [de-identified] : Since her last visit in December 2023, patient was approved to receive G-CSF postchemotherapy, so she was able to continue regular treatment with paclitaxel/pembrolizumab (started 10/20/2022); finished last paclitaxel dose 3/16/23.  Complains of fatigue, generalized weakness.  Denies infections or need for antibiotic treatment.  At times she held her antihypertensive medication given recurrent weakness and dizziness.  Most recent blood work in mid January 2023 showing WBC 3.09, hemoglobin 10.1 g/dL and a platelet count at 180,000. \par \par \par \par \par

## 2023-04-03 NOTE — PHYSICAL EXAM
[Restricted in physically strenuous activity but ambulatory and able to carry out work of a light or sedentary nature] : Status 1- Restricted in physically strenuous activity but ambulatory and able to carry out work of a light or sedentary nature, e.g., light house work, office work [Thrush] : thrush [Normal] : normal appearance, no rash, nodules, vesicles, ulcers, erythema [de-identified] : normal breast inspection and palpation of axillas . Nodularity along the right breast 9:00 location, but no discrete palpable mass.

## 2023-04-03 NOTE — ASSESSMENT
[FreeTextEntry1] : Ms. BALDERRAMA 's questions were answered to her satisfaction. \par She  expressed her  understanding and willingness to comply with the above recommendations, and  will return to the office in 2 months.\par \par

## 2023-04-10 ENCOUNTER — APPOINTMENT (OUTPATIENT)
Dept: SURGICAL ONCOLOGY | Facility: CLINIC | Age: 73
End: 2023-04-10
Payer: MEDICARE

## 2023-04-10 VITALS
BODY MASS INDEX: 25.66 KG/M2 | HEIGHT: 65 IN | RESPIRATION RATE: 15 BRPM | WEIGHT: 154 LBS | SYSTOLIC BLOOD PRESSURE: 166 MMHG | OXYGEN SATURATION: 99 % | DIASTOLIC BLOOD PRESSURE: 88 MMHG | HEART RATE: 68 BPM

## 2023-04-10 PROCEDURE — 99214 OFFICE O/P EST MOD 30 MIN: CPT

## 2023-04-10 NOTE — ASSESSMENT
[FreeTextEntry1] : We discussed the options of breast conservation and mastectomy.  Arti wishes to undergo a mastectomy given the initial multifocal disease.  She is also considering a prophylactic left mastectomy as well.  She is deferring breast reconstruction.  We are arranging re-staging imaging.  Arti will also get genetic testing.

## 2023-04-10 NOTE — PHYSICAL EXAM
[Normal] : normal breast inspection and palpation of axillas [Normal Neck Lymph Nodes] : normal neck lymph nodes  [Normal Supraclavicular Lymph Nodes] : normal supraclavicular lymph nodes [Normal Groin Lymph Nodes] : normal groin lymph nodes [Normal Axillary Lymph Nodes] : normal axillary lymph nodes [Normal] : oriented to person, place and time, with appropriate affect [de-identified] : No dominant masses on either side.  No axillary lymphadenopathy on either side.

## 2023-04-10 NOTE — HISTORY OF PRESENT ILLNESS
[de-identified] : Arti is a pleasant 73 year-old female here for a follow-up, referred by Dr. Jaquan Ambrose. \par \par She completed a bilateral screening mammogram on 3/30/22 which revealed suggestion of spiculated masses/architectural distortion in the outer central right breast.  Subcentimeter masses are seen in the lower central and lower inner right breast 4-5 cmfn.  Questionable architectural distortion in the central left breast 6 cmfn seen on CC view.  Diagnostic mammogram/sonogram is recommended as well as biopsy of the spiculated masses/architectural distortion in the outer central right breast (BIRADS 4).\par \par Subsequent bilateral diagnostic mammogram and sonogram was performed on 5/3/22. 4 masses are seen in the right breast at 9:00, 12-14 cmfn are highly suggestive of malignancy.  Ultrasound-guided core biopsy of one of the masses is recommended (BIRADS 5). \par \par Biopsy was performed on 22.  Pathology demonstrated invasive mixed poorly differentiated duct and lobular carcinoma (ER, [place] and HER 2 status pending).\par \par Her past medical history includes hypertension (on olmesartan), hypothyroid (on synthroid). Past surgical history of 1 .\par Arti denies any personal or family history of breast cancer, she does have a sister with Ovarian cancer at age 54.\par She reports daily caffeine intake with 1 cup of tea, denies any history of alcohol or tobacco use. \par \par 2022: Today she denies palpable breast masses, nipple discharge, skin changes, inversion or breast pain. We discussed the need for staging breast MRI, and CT scans of the chest, abdomen, and pelvis.  Arti will follow-up with me upon completion of the imaging to discuss treatment options including breast conservation versus mastectomy.  She may also need a medical oncology evaluation pending the final results of her receptor studies.\par \par [facility] pathology of biopsy slides: moderately differentiated invasive lobular carcinoma, (ER-/DE-/HER2-)\par \par CT C/A/P from 2022: Multiple small retroperitoneal lymph nodes, the largest of which are considered mildly enlarged. This is nonspecific and may be chronic or reactive. Metastatic disease from breast carcinoma would be considered much less likely although clinical correlation is recommended. PET/CT may be of value if clinically warranted.\par \par Bilateral MR of breasts on 2022: Conglomerate grouping of small irregular masses in the posterior lower outer right breast spanning 3.3 cm, corresponding to a site of biopsy-proven malignancy with contiguous enhancing foci and branch nonmass enhancement extending an additional 5.5 cm anterior to the conglomerate masses, concerning for additional disease. If breast conservation is considered, MRI guided biopsy of a more anterior aspect of this nonmass enhancement is suggested.\par \par 0.7 cm ovoid enhancing mass in the lower outer right breast anterior depth, approximately 3.5 cm posterior to the nipple which is indeterminate. If breast conservation is considered, MRI guided biopsy is recommended.\par No suspicious enhancing findings in the left breast.\par No lymphadenopathy.\par \par 2022: Spoke with Arti on the phone regarding her MRI & CT results, will discuss further in office. She will also need [facility]. \par \par 2022: Arti returns today to discuss her recent imaging in details and discuss next steps. \par \par She consulted with Dr. Marnia Alvarez from medical oncology on 22 who recommended neoadjuvant chemotherapy (ddAC-T).  \par \par PET/CT 22- minimally FDG-avid subcentimeter soft tissue densities in the right breast.  Few small mildly FDG-avid lymph nodes in bilateral axillary and retroperitoneal regions and in the retroperitoneum, not significantly changed in size of number compared to CT dated 22, and nonspecific.  As lobular breast carcinoma typically has low metabolic activity, further characterization may be performed with FES/PET/CT ([place]).  Axillary/retropectoral lymph nodes may be further evaluated with ultrasound and percutaneous needle biopsy as indicated. \par \par Pelvis US 22- small fluid within the endometrial cavity which likely accounts for most of the apparent thickening of th endometrium on CT.  Possible intramural/submucosal leiomyoma with no corresponding abnormality on PET.  FOllow up endovaginal pelvic sonography in 6 months to one year can be performed.  \par \par She completed cycle #1 AC/pembrolizumab on 22.  She completed her last dose of paclitazel on 3/16/23. \par \par 4/10/23- Arti doing well over all. She completed chemo therapy and will undergo repeat imaging.

## 2023-04-10 NOTE — CONSULT LETTER
[Dear  ___] : Dear  [unfilled], [Consult Letter:] : I had the pleasure of evaluating your patient, [unfilled]. [Please see my note below.] : Please see my note below. [Consult Closing:] : Thank you very much for allowing me to participate in the care of this patient.  If you have any questions, please do not hesitate to contact me. [Sincerely,] : Sincerely, [FreeTextEntry2] : Jaquan Ambrose MD [FreeTextEntry3] : Aldo Sainz MD\par Surgical Oncology\par [facility]\par Office: [phone]\par Cell: [phone]\par

## 2023-04-11 ENCOUNTER — RESULT REVIEW (OUTPATIENT)
Age: 73
End: 2023-04-11

## 2023-04-25 ENCOUNTER — APPOINTMENT (OUTPATIENT)
Dept: ULTRASOUND IMAGING | Facility: IMAGING CENTER | Age: 73
End: 2023-04-25
Payer: MEDICARE

## 2023-04-25 ENCOUNTER — APPOINTMENT (OUTPATIENT)
Dept: MAMMOGRAPHY | Facility: IMAGING CENTER | Age: 73
End: 2023-04-25
Payer: MEDICARE

## 2023-04-25 ENCOUNTER — OUTPATIENT (OUTPATIENT)
Dept: OUTPATIENT SERVICES | Facility: HOSPITAL | Age: 73
LOS: 1 days | End: 2023-04-25
Payer: COMMERCIAL

## 2023-04-25 ENCOUNTER — RESULT REVIEW (OUTPATIENT)
Age: 73
End: 2023-04-25

## 2023-04-25 DIAGNOSIS — Z00.8 ENCOUNTER FOR OTHER GENERAL EXAMINATION: ICD-10-CM

## 2023-04-25 PROCEDURE — G0279: CPT

## 2023-04-25 PROCEDURE — 77066 DX MAMMO INCL CAD BI: CPT | Mod: 26

## 2023-04-25 PROCEDURE — 76641 ULTRASOUND BREAST COMPLETE: CPT | Mod: 26,50

## 2023-04-25 PROCEDURE — 77066 DX MAMMO INCL CAD BI: CPT

## 2023-04-25 PROCEDURE — G0279: CPT | Mod: 26

## 2023-04-25 PROCEDURE — 76641 ULTRASOUND BREAST COMPLETE: CPT

## 2023-04-28 ENCOUNTER — APPOINTMENT (OUTPATIENT)
Dept: CT IMAGING | Facility: IMAGING CENTER | Age: 73
End: 2023-04-28
Payer: MEDICARE

## 2023-04-28 ENCOUNTER — APPOINTMENT (OUTPATIENT)
Dept: MRI IMAGING | Facility: IMAGING CENTER | Age: 73
End: 2023-04-28
Payer: MEDICARE

## 2023-04-28 ENCOUNTER — OUTPATIENT (OUTPATIENT)
Dept: OUTPATIENT SERVICES | Facility: HOSPITAL | Age: 73
LOS: 1 days | End: 2023-04-28
Payer: COMMERCIAL

## 2023-04-28 DIAGNOSIS — C50.911 MALIGNANT NEOPLASM OF UNSPECIFIED SITE OF RIGHT FEMALE BREAST: ICD-10-CM

## 2023-04-28 PROCEDURE — 74177 CT ABD & PELVIS W/CONTRAST: CPT

## 2023-04-28 PROCEDURE — 77049 MRI BREAST C-+ W/CAD BI: CPT | Mod: 26

## 2023-04-28 PROCEDURE — 71260 CT THORAX DX C+: CPT

## 2023-04-28 PROCEDURE — 74177 CT ABD & PELVIS W/CONTRAST: CPT | Mod: 26

## 2023-04-28 PROCEDURE — 71260 CT THORAX DX C+: CPT | Mod: 26

## 2023-04-28 PROCEDURE — C8908: CPT

## 2023-04-28 PROCEDURE — A9585: CPT

## 2023-04-28 PROCEDURE — C8937: CPT

## 2023-05-01 ENCOUNTER — NON-APPOINTMENT (OUTPATIENT)
Age: 73
End: 2023-05-01

## 2023-05-25 ENCOUNTER — OUTPATIENT (OUTPATIENT)
Dept: OUTPATIENT SERVICES | Facility: HOSPITAL | Age: 73
LOS: 1 days | End: 2023-05-25

## 2023-05-25 VITALS
SYSTOLIC BLOOD PRESSURE: 152 MMHG | TEMPERATURE: 98 F | WEIGHT: 154.1 LBS | OXYGEN SATURATION: 100 % | HEIGHT: 64 IN | RESPIRATION RATE: 16 BRPM | HEART RATE: 63 BPM | DIASTOLIC BLOOD PRESSURE: 80 MMHG

## 2023-05-25 DIAGNOSIS — C50.919 MALIGNANT NEOPLASM OF UNSPECIFIED SITE OF UNSPECIFIED FEMALE BREAST: ICD-10-CM

## 2023-05-25 DIAGNOSIS — Z98.890 OTHER SPECIFIED POSTPROCEDURAL STATES: Chronic | ICD-10-CM

## 2023-05-25 DIAGNOSIS — Z98.891 HISTORY OF UTERINE SCAR FROM PREVIOUS SURGERY: Chronic | ICD-10-CM

## 2023-05-25 DIAGNOSIS — E03.9 HYPOTHYROIDISM, UNSPECIFIED: ICD-10-CM

## 2023-05-25 DIAGNOSIS — C50.911 MALIGNANT NEOPLASM OF UNSPECIFIED SITE OF RIGHT FEMALE BREAST: ICD-10-CM

## 2023-05-25 LAB
ALBUMIN SERPL ELPH-MCNC: 4.1 G/DL — SIGNIFICANT CHANGE UP (ref 3.3–5)
ALP SERPL-CCNC: 98 U/L — SIGNIFICANT CHANGE UP (ref 40–120)
ALT FLD-CCNC: 20 U/L — SIGNIFICANT CHANGE UP (ref 4–33)
ANION GAP SERPL CALC-SCNC: 11 MMOL/L — SIGNIFICANT CHANGE UP (ref 7–14)
AST SERPL-CCNC: 29 U/L — SIGNIFICANT CHANGE UP (ref 4–32)
BILIRUB SERPL-MCNC: 0.4 MG/DL — SIGNIFICANT CHANGE UP (ref 0.2–1.2)
BUN SERPL-MCNC: 19 MG/DL — SIGNIFICANT CHANGE UP (ref 7–23)
CALCIUM SERPL-MCNC: 9.8 MG/DL — SIGNIFICANT CHANGE UP (ref 8.4–10.5)
CHLORIDE SERPL-SCNC: 100 MMOL/L — SIGNIFICANT CHANGE UP (ref 98–107)
CO2 SERPL-SCNC: 28 MMOL/L — SIGNIFICANT CHANGE UP (ref 22–31)
CREAT SERPL-MCNC: 1.15 MG/DL — SIGNIFICANT CHANGE UP (ref 0.5–1.3)
EGFR: 50 ML/MIN/1.73M2 — LOW
GLUCOSE SERPL-MCNC: 83 MG/DL — SIGNIFICANT CHANGE UP (ref 70–99)
HCT VFR BLD CALC: 31.2 % — LOW (ref 34.5–45)
HGB BLD-MCNC: 10.2 G/DL — LOW (ref 11.5–15.5)
MCHC RBC-ENTMCNC: 32.3 PG — SIGNIFICANT CHANGE UP (ref 27–34)
MCHC RBC-ENTMCNC: 32.7 GM/DL — SIGNIFICANT CHANGE UP (ref 32–36)
MCV RBC AUTO: 98.7 FL — SIGNIFICANT CHANGE UP (ref 80–100)
NRBC # BLD: 0 /100 WBCS — SIGNIFICANT CHANGE UP (ref 0–0)
NRBC # FLD: 0 K/UL — SIGNIFICANT CHANGE UP (ref 0–0)
PLATELET # BLD AUTO: 154 K/UL — SIGNIFICANT CHANGE UP (ref 150–400)
POTASSIUM SERPL-MCNC: 4 MMOL/L — SIGNIFICANT CHANGE UP (ref 3.5–5.3)
POTASSIUM SERPL-SCNC: 4 MMOL/L — SIGNIFICANT CHANGE UP (ref 3.5–5.3)
PROT SERPL-MCNC: 7.6 G/DL — SIGNIFICANT CHANGE UP (ref 6–8.3)
RBC # BLD: 3.16 M/UL — LOW (ref 3.8–5.2)
RBC # FLD: 12.9 % — SIGNIFICANT CHANGE UP (ref 10.3–14.5)
SODIUM SERPL-SCNC: 139 MMOL/L — SIGNIFICANT CHANGE UP (ref 135–145)
WBC # BLD: 4.62 K/UL — SIGNIFICANT CHANGE UP (ref 3.8–10.5)
WBC # FLD AUTO: 4.62 K/UL — SIGNIFICANT CHANGE UP (ref 3.8–10.5)

## 2023-05-25 RX ORDER — SODIUM CHLORIDE 9 MG/ML
1000 INJECTION, SOLUTION INTRAVENOUS
Refills: 0 | Status: DISCONTINUED | OUTPATIENT
Start: 2023-06-07 | End: 2023-06-08

## 2023-05-25 RX ORDER — LEVOTHYROXINE SODIUM 125 MCG
1 TABLET ORAL
Qty: 0 | Refills: 0 | DISCHARGE

## 2023-05-25 NOTE — H&P PST ADULT - HISTORY OF PRESENT ILLNESS
Pt is a 73 yr old female scheduled for Right Mastectomy B/L [place] Lymph Node Dissection Left Prophylactic Mastectomy Injection in the OR with Dr Sainz tentatively 6/7/23 - pt hx abnormal Mammo 2022 - chemo completed 3/23 and pt now for surgery . Pt denies pain or swelling of breasts - port in place left upper chest

## 2023-05-25 NOTE — H&P PST ADULT - ATTENDING COMMENTS
D/w pt plan for right mastectomy, left prophylactic mastectomy, bilateral SLNB    Discussed r/b/a post op expectations poss complications.      Pt understands and agrees to proceed.

## 2023-05-25 NOTE — H&P PST ADULT - PROBLEM SELECTOR PLAN 1
Pt scheduled for surgery and preop instructions including instructions for taking Famotidine and for Chlorhexidine use in showering on the day of surgery, given verbally and with use of  written materials, and patient confirming understanding of such instructions using  teach back method.  MC brought by patient and in chart

## 2023-05-25 NOTE — H&P PST ADULT - FUNCTIONAL STATUS
Pt can walk 3-4 blocks and go up 1 flight stairs w/o SOB - house chores and shopping/4-10 METS Pt can walk 3-4 blocks and go up 1 flight stairs w/o SOB - house chores and shopping, light gardening/4-10 METS

## 2023-05-25 NOTE — H&P PST ADULT - LAST CARDIAC ANGIOGRAM/IMAGING
Impression: Hypermetropia, bilateral: H52.03. Plan: New spec Rx given - Discussed changes and possible adaptation period. 

RTC 1 year/PRN
denies

## 2023-05-30 ENCOUNTER — APPOINTMENT (OUTPATIENT)
Dept: SURGICAL ONCOLOGY | Facility: CLINIC | Age: 73
End: 2023-05-30
Payer: MEDICARE

## 2023-05-30 VITALS
HEART RATE: 74 BPM | RESPIRATION RATE: 16 BRPM | OXYGEN SATURATION: 98 % | WEIGHT: 154 LBS | DIASTOLIC BLOOD PRESSURE: 72 MMHG | BODY MASS INDEX: 25.66 KG/M2 | SYSTOLIC BLOOD PRESSURE: 166 MMHG | HEIGHT: 65 IN

## 2023-05-30 PROCEDURE — 99214 OFFICE O/P EST MOD 30 MIN: CPT

## 2023-05-30 NOTE — HISTORY OF PRESENT ILLNESS
[de-identified] : Arti is a pleasant 73 year-old female here for a follow-up, referred by Dr. Jaquan Ambrose. \par \par She completed a bilateral screening mammogram on 3/30/22 which revealed suggestion of spiculated masses/architectural distortion in the outer central right breast.  Subcentimeter masses are seen in the lower central and lower inner right breast 4-5 cmfn.  Questionable architectural distortion in the central left breast 6 cmfn seen on CC view.  Diagnostic mammogram/sonogram is recommended as well as biopsy of the spiculated masses/architectural distortion in the outer central right breast (BIRADS 4).\par \par Subsequent bilateral diagnostic mammogram and sonogram was performed on 5/3/22. 4 masses are seen in the right breast at 9:00, 12-14 cmfn are highly suggestive of malignancy.  Ultrasound-guided core biopsy of one of the masses is recommended (BIRADS 5). \par \par Biopsy was performed on 22.  Pathology demonstrated invasive mixed poorly differentiated duct and lobular carcinoma (ER, [place] and HER 2 status pending).\par \par Her past medical history includes hypertension (on olmesartan), hypothyroid (on synthroid). Past surgical history of 1 .\par Arti denies any personal or family history of breast cancer, she does have a sister with Ovarian cancer at age 54.\par She reports daily caffeine intake with 1 cup of tea, denies any history of alcohol or tobacco use. \par \par 2022: Today she denies palpable breast masses, nipple discharge, skin changes, inversion or breast pain. We discussed the need for staging breast MRI, and CT scans of the chest, abdomen, and pelvis.  Arti will follow-up with me upon completion of the imaging to discuss treatment options including breast conservation versus mastectomy.  She may also need a medical oncology evaluation pending the final results of her receptor studies.\par \par [facility] pathology of biopsy slides: moderately differentiated invasive lobular carcinoma, (ER-/[place]-/HER2-)\par \par CT C/A/P from 2022: Multiple small retroperitoneal lymph nodes, the largest of which are considered mildly enlarged. This is nonspecific and may be chronic or reactive. Metastatic disease from breast carcinoma would be considered much less likely although clinical correlation is recommended. PET/CT may be of value if clinically warranted.\par \par Bilateral MR of breasts on 2022: Conglomerate grouping of small irregular masses in the posterior lower outer right breast spanning 3.3 cm, corresponding to a site of biopsy-proven malignancy with contiguous enhancing foci and branch nonmass enhancement extending an additional 5.5 cm anterior to the conglomerate masses, concerning for additional disease. If breast conservation is considered, MRI guided biopsy of a more anterior aspect of this nonmass enhancement is suggested.\par \par 0.7 cm ovoid enhancing mass in the lower outer right breast anterior depth, approximately 3.5 cm posterior to the nipple which is indeterminate. If breast conservation is considered, MRI guided biopsy is recommended.\par No suspicious enhancing findings in the left breast.\par No lymphadenopathy.\par \par 2022: Spoke with Arti on the phone regarding her MRI & CT results, will discuss further in office. She will also need [facility]. \par \par 2022: Arti returns today to discuss her recent imaging in details and discuss next steps. \par \par She consulted with Dr. Marina Alvarez from medical oncology on 22 who recommended neoadjuvant chemotherapy (ddAC-T).  \par \par PET/CT 22- minimally FDG-avid subcentimeter soft tissue densities in the right breast.  Few small mildly FDG-avid lymph nodes in bilateral axillary and retroperitoneal regions and in the retroperitoneum, not significantly changed in size of number compared to CT dated 22, and nonspecific.  As lobular breast carcinoma typically has low metabolic activity, further characterization may be performed with FES/PET/CT ([place]).  Axillary/retropectoral lymph nodes may be further evaluated with ultrasound and percutaneous needle biopsy as indicated. \par \par Pelvis US 22- small fluid within the endometrial cavity which likely accounts for most of the apparent thickening of th endometrium on CT.  Possible intramural/submucosal leiomyoma with no corresponding abnormality on PET.  FOllow up endovaginal pelvic sonography in 6 months to one year can be performed.  \par \par She completed cycle #1 AC/pembrolizumab on 22.  She completed her last dose of paclitazel on 3/16/23. \par \par 4/10/23- Arti doing well over all. She completed chemo therapy and will undergo repeat imaging. We discussed the options of breast conservation and mastectomy.  Arti wishes to undergo a mastectomy given the initial multifocal disease.  She is also considering a prophylactic left mastectomy as well.  She is deferring breast reconstruction.  We are arranging re-staging imaging.  Arti will also get genetic testing.  \par \par Genetics 2023 with negative findings \par \par B/L diagnostic mammo/sono 2023- No mammographic or sonographic evidence of malignancy in either breast at this time. The previous abnormal findings in the posterior central outer right breast on mammography and 9:00 right breast on sonography are not seen at this time. BI-RADS 6\par \par CT C/A/P 2023 - No evidence of metastatic disease.\par Small pericardial effusion is again appreciated\par \par Breast MRI 2023- Near complete imaging response post neoadjuvant chemotherapy, as described above. Few residual foci at the site of known index malignancy in the posterior lower outer right breast and extending anteriorly from the site of known second site of malignancy in the lower outer right breast. Appropriate clinical, surgical and oncological management of the known right breast malignancies are recommended.\par Previously reported enhancing mass in the lower outer anterior right breast is no longer seen. Reportedly, this was biopsied on 2022 revealing intraductal papilloma. The susceptibility artifact from biopsy marker is seen in the lower slightly inner right breast. Surgical consultation for the known right breast papilloma.\par \par 2023- Arti returns to discuss surgical planning, she is tentatively scheduled for B/L mastectomies next week on 2023.

## 2023-05-30 NOTE — ASSESSMENT
[FreeTextEntry1] : We discussed the risks, benefits and alternatives of the bilateral mastectomies with sentinel lymph node dissections we also discussed post operative expectations and possible complications.  Arti expresses understanding and agrees to proceed.\par

## 2023-05-30 NOTE — CONSULT LETTER
[Dear  ___] : Dear  [unfilled], [Consult Letter:] : I had the pleasure of evaluating your patient, [unfilled]. [Please see my note below.] : Please see my note below. [Consult Closing:] : Thank you very much for allowing me to participate in the care of this patient.  If you have any questions, please do not hesitate to contact me. [Sincerely,] : Sincerely, [DrMagda  ___] : Dr. CARDOSO [FreeTextEntry2] : Jaquan Ambrose MD [FreeTextEntry3] : Aldo Sainz MD\par Surgical Oncology\par [facility]\par Office: [phone]\par Cell: [phone]\par

## 2023-06-04 ENCOUNTER — NON-APPOINTMENT (OUTPATIENT)
Age: 73
End: 2023-06-04

## 2023-06-06 ENCOUNTER — TRANSCRIPTION ENCOUNTER (OUTPATIENT)
Age: 73
End: 2023-06-06

## 2023-06-06 LAB — SARS-COV-2 N GENE NPH QL NAA+PROBE: NOT DETECTED

## 2023-06-06 NOTE — ASU PATIENT PROFILE, ADULT - FALL HARM RISK - UNIVERSAL INTERVENTIONS
Bed in lowest position, wheels locked, appropriate side rails in place/Call bell, personal items and telephone in reach/Instruct patient to call for assistance before getting out of bed or chair/Non-slip footwear when patient is out of bed/[place] to call system/Physically safe environment - no spills, clutter or unnecessary equipment/Purposeful Proactive Rounding/Room/bathroom lighting operational, light cord in reach

## 2023-06-07 ENCOUNTER — RESULT REVIEW (OUTPATIENT)
Age: 73
End: 2023-06-07

## 2023-06-07 ENCOUNTER — INPATIENT (INPATIENT)
Facility: HOSPITAL | Age: 73
LOS: 2 days | Discharge: HOME | End: 2023-06-10
Attending: SURGERY | Admitting: SURGERY
Payer: MEDICARE

## 2023-06-07 ENCOUNTER — APPOINTMENT (OUTPATIENT)
Dept: SURGICAL ONCOLOGY | Facility: HOSPITAL | Age: 73
End: 2023-06-07

## 2023-06-07 ENCOUNTER — APPOINTMENT (OUTPATIENT)
Dept: NUCLEAR MEDICINE | Facility: HOSPITAL | Age: 73
End: 2023-06-07

## 2023-06-07 VITALS
TEMPERATURE: 98 F | RESPIRATION RATE: 16 BRPM | OXYGEN SATURATION: 100 % | WEIGHT: 154.1 LBS | DIASTOLIC BLOOD PRESSURE: 74 MMHG | SYSTOLIC BLOOD PRESSURE: 156 MMHG | HEIGHT: 64 IN | HEART RATE: 60 BPM

## 2023-06-07 DIAGNOSIS — Z98.891 HISTORY OF UTERINE SCAR FROM PREVIOUS SURGERY: Chronic | ICD-10-CM

## 2023-06-07 DIAGNOSIS — Z98.890 OTHER SPECIFIED POSTPROCEDURAL STATES: Chronic | ICD-10-CM

## 2023-06-07 DIAGNOSIS — C50.911 MALIGNANT NEOPLASM OF UNSPECIFIED SITE OF RIGHT FEMALE BREAST: ICD-10-CM

## 2023-06-07 LAB
ANION GAP SERPL CALC-SCNC: 11 MMOL/L — SIGNIFICANT CHANGE UP (ref 7–14)
BUN SERPL-MCNC: 17 MG/DL — SIGNIFICANT CHANGE UP (ref 7–23)
CALCIUM SERPL-MCNC: 9.2 MG/DL — SIGNIFICANT CHANGE UP (ref 8.4–10.5)
CHLORIDE SERPL-SCNC: 103 MMOL/L — SIGNIFICANT CHANGE UP (ref 98–107)
CO2 SERPL-SCNC: 25 MMOL/L — SIGNIFICANT CHANGE UP (ref 22–31)
CREAT SERPL-MCNC: 1.16 MG/DL — SIGNIFICANT CHANGE UP (ref 0.5–1.3)
EGFR: 49 ML/MIN/1.73M2 — LOW
GLUCOSE SERPL-MCNC: 142 MG/DL — HIGH (ref 70–99)
HCT VFR BLD CALC: 27.9 % — LOW (ref 34.5–45)
HGB BLD-MCNC: 9 G/DL — LOW (ref 11.5–15.5)
MAGNESIUM SERPL-MCNC: 1.9 MG/DL — SIGNIFICANT CHANGE UP (ref 1.6–2.6)
MCHC RBC-ENTMCNC: 32.1 PG — SIGNIFICANT CHANGE UP (ref 27–34)
MCHC RBC-ENTMCNC: 32.3 GM/DL — SIGNIFICANT CHANGE UP (ref 32–36)
MCV RBC AUTO: 99.6 FL — SIGNIFICANT CHANGE UP (ref 80–100)
NRBC # BLD: 0 /100 WBCS — SIGNIFICANT CHANGE UP (ref 0–0)
NRBC # FLD: 0 K/UL — SIGNIFICANT CHANGE UP (ref 0–0)
PHOSPHATE SERPL-MCNC: 3.3 MG/DL — SIGNIFICANT CHANGE UP (ref 2.5–4.5)
PLATELET # BLD AUTO: 132 K/UL — LOW (ref 150–400)
POTASSIUM SERPL-MCNC: 4.1 MMOL/L — SIGNIFICANT CHANGE UP (ref 3.5–5.3)
POTASSIUM SERPL-SCNC: 4.1 MMOL/L — SIGNIFICANT CHANGE UP (ref 3.5–5.3)
RBC # BLD: 2.8 M/UL — LOW (ref 3.8–5.2)
RBC # FLD: 13.2 % — SIGNIFICANT CHANGE UP (ref 10.3–14.5)
SODIUM SERPL-SCNC: 139 MMOL/L — SIGNIFICANT CHANGE UP (ref 135–145)
WBC # BLD: 7.77 K/UL — SIGNIFICANT CHANGE UP (ref 3.8–10.5)
WBC # FLD AUTO: 7.77 K/UL — SIGNIFICANT CHANGE UP (ref 3.8–10.5)

## 2023-06-07 PROCEDURE — 88332 PATH CONSLTJ SURG EA ADD BLK: CPT | Mod: 26

## 2023-06-07 PROCEDURE — 88342 IMHCHEM/IMCYTCHM 1ST ANTB: CPT | Mod: 26

## 2023-06-07 PROCEDURE — 19303 MAST SIMPLE COMPLETE: CPT | Mod: 50

## 2023-06-07 PROCEDURE — 88307 TISSUE EXAM BY PATHOLOGIST: CPT | Mod: 26

## 2023-06-07 PROCEDURE — 19303 MAST SIMPLE COMPLETE: CPT | Mod: 82,50

## 2023-06-07 PROCEDURE — 38900 IO MAP OF SENT LYMPH NODE: CPT | Mod: 50

## 2023-06-07 PROCEDURE — 88331 PATH CONSLTJ SURG 1 BLK 1SPC: CPT | Mod: 26

## 2023-06-07 PROCEDURE — 38525 BIOPSY/REMOVAL LYMPH NODES: CPT | Mod: 50

## 2023-06-07 PROCEDURE — 88341 IMHCHEM/IMCYTCHM EA ADD ANTB: CPT | Mod: 26

## 2023-06-07 DEVICE — CLIP APPLIER COVIDIEN SURGICLIP 11.5" MEDIUM: Type: IMPLANTABLE DEVICE | Status: FUNCTIONAL

## 2023-06-07 DEVICE — ARISTA 3GR: Type: IMPLANTABLE DEVICE | Status: FUNCTIONAL

## 2023-06-07 RX ORDER — SODIUM CHLORIDE 9 MG/ML
1000 INJECTION, SOLUTION INTRAVENOUS
Refills: 0 | Status: DISCONTINUED | OUTPATIENT
Start: 2023-06-07 | End: 2023-06-07

## 2023-06-07 RX ORDER — OXYCODONE HYDROCHLORIDE 5 MG/1
5 TABLET ORAL ONCE
Refills: 0 | Status: DISCONTINUED | OUTPATIENT
Start: 2023-06-07 | End: 2023-06-07

## 2023-06-07 RX ORDER — HEPARIN SODIUM 5000 [USP'U]/ML
5000 INJECTION INTRAVENOUS; SUBCUTANEOUS EVERY 8 HOURS
Refills: 0 | Status: DISCONTINUED | OUTPATIENT
Start: 2023-06-07 | End: 2023-06-10

## 2023-06-07 RX ORDER — LOSARTAN POTASSIUM 100 MG/1
50 TABLET, FILM COATED ORAL DAILY
Refills: 0 | Status: DISCONTINUED | OUTPATIENT
Start: 2023-06-07 | End: 2023-06-10

## 2023-06-07 RX ORDER — FENTANYL CITRATE 50 UG/ML
25 INJECTION INTRAVENOUS
Refills: 0 | Status: DISCONTINUED | OUTPATIENT
Start: 2023-06-07 | End: 2023-06-07

## 2023-06-07 RX ORDER — OXYCODONE HYDROCHLORIDE 5 MG/1
2.5 TABLET ORAL EVERY 6 HOURS
Refills: 0 | Status: DISCONTINUED | OUTPATIENT
Start: 2023-06-07 | End: 2023-06-07

## 2023-06-07 RX ORDER — LEVOTHYROXINE SODIUM 125 MCG
88 TABLET ORAL DAILY
Refills: 0 | Status: DISCONTINUED | OUTPATIENT
Start: 2023-06-07 | End: 2023-06-10

## 2023-06-07 RX ORDER — HYDROMORPHONE HYDROCHLORIDE 2 MG/ML
0.5 INJECTION INTRAMUSCULAR; INTRAVENOUS; SUBCUTANEOUS EVERY 6 HOURS
Refills: 0 | Status: DISCONTINUED | OUTPATIENT
Start: 2023-06-07 | End: 2023-06-08

## 2023-06-07 RX ORDER — ONDANSETRON 8 MG/1
4 TABLET, FILM COATED ORAL ONCE
Refills: 0 | Status: DISCONTINUED | OUTPATIENT
Start: 2023-06-07 | End: 2023-06-07

## 2023-06-07 RX ORDER — OXYCODONE HYDROCHLORIDE 5 MG/1
10 TABLET ORAL EVERY 4 HOURS
Refills: 0 | Status: DISCONTINUED | OUTPATIENT
Start: 2023-06-07 | End: 2023-06-10

## 2023-06-07 RX ORDER — ACETAMINOPHEN 500 MG
1000 TABLET ORAL EVERY 6 HOURS
Refills: 0 | Status: DISCONTINUED | OUTPATIENT
Start: 2023-06-07 | End: 2023-06-10

## 2023-06-07 RX ORDER — LEVOTHYROXINE SODIUM 125 MCG
1 TABLET ORAL
Refills: 0 | DISCHARGE

## 2023-06-07 RX ORDER — HYDROMORPHONE HYDROCHLORIDE 2 MG/ML
0.5 INJECTION INTRAMUSCULAR; INTRAVENOUS; SUBCUTANEOUS
Refills: 0 | Status: DISCONTINUED | OUTPATIENT
Start: 2023-06-07 | End: 2023-06-07

## 2023-06-07 RX ORDER — OXYCODONE HYDROCHLORIDE 5 MG/1
5 TABLET ORAL EVERY 4 HOURS
Refills: 0 | Status: DISCONTINUED | OUTPATIENT
Start: 2023-06-07 | End: 2023-06-10

## 2023-06-07 RX ORDER — LOSARTAN POTASSIUM 100 MG/1
50 TABLET, FILM COATED ORAL DAILY
Refills: 0 | Status: DISCONTINUED | OUTPATIENT
Start: 2023-06-07 | End: 2023-06-07

## 2023-06-07 RX ADMIN — Medication 1000 MILLIGRAM(S): at 17:45

## 2023-06-07 RX ADMIN — HEPARIN SODIUM 5000 UNIT(S): 5000 INJECTION INTRAVENOUS; SUBCUTANEOUS at 17:45

## 2023-06-07 NOTE — CHART NOTE - NSCHARTNOTEFT_GEN_A_CORE
Procedure: b/l mastectomy w/ SLN bx  Surgeon: Alistair LAWLER: Pt has no complaints. Denies CP, SOB, LARRY, calf tenderness, n/v. Pain controlled with medication. Hasn't had much PO intake, but would like food/liquids.    O:  T(C): 36.6 (06-07-23 @ 13:00), Max: 36.6 (06-07-23 @ 13:00)  T(F): 97.9 (06-07-23 @ 13:00), Max: 97.9 (06-07-23 @ 13:00)  HR: 59 (06-07-23 @ 14:00) (52 - 62)  BP: 140/74 (06-07-23 @ 14:00) (133/71 - 161/73)  RR: 16 (06-07-23 @ 13:45) (4 - 21)  SpO2: 100% (06-07-23 @ 14:00) (98% - 100%)  Wt(kg): --                        9.0    7.77  )-----------( 132      ( 07 Jun 2023 13:06 )             27.9     06-07    139  |  103  |  17  ----------------------------<  142<H>  4.1   |  25  |  1.16    Ca    9.2      07 Jun 2023 13:06  Phos  3.3     06-07  Mg     1.90     06-07        Gen: NAD, resting comfortably in bed  C/V: NSR  Pulm: Nonlabored breathing, no respiratory distress  Abd: soft, NT/ND Incision:  Extrem: WWP, no calf edema, SCDs in place      A/P: 73yFemale s/p above procedure  Diet:  IVF:  Pain/nausea control  DVT ppx:  Dispo plan: Procedure: b/l mastectomy w/ SLN bx  Surgeon: Alistair    S: Pt has no complaints. Denies CP, SOB, LARRY, calf tenderness, n/v. Pain controlled with medication. Hasn't had much PO intake, but would like food/liquids.    O:  T(C): 36.6 (06-07-23 @ 13:00), Max: 36.6 (06-07-23 @ 13:00)  T(F): 97.9 (06-07-23 @ 13:00), Max: 97.9 (06-07-23 @ 13:00)  HR: 59 (06-07-23 @ 14:00) (52 - 62)  BP: 140/74 (06-07-23 @ 14:00) (133/71 - 161/73)  RR: 16 (06-07-23 @ 13:45) (4 - 21)  SpO2: 100% (06-07-23 @ 14:00) (98% - 100%)  Wt(kg): --                        9.0    7.77  )-----------( 132      ( 07 Jun 2023 13:06 )             27.9     06-07    139  |  103  |  17  ----------------------------<  142<H>  4.1   |  25  |  1.16    Ca    9.2      07 Jun 2023 13:06  Phos  3.3     06-07  Mg     1.90     06-07        Gen: NAD, resting comfortably in bed  Chest:  Right: dressing w/ minimal strikethrough. STACEY w/ ss output that is more sanguinous than serous  Left: dressing w/ some saturation. STACEY w/ sanguinous output, but appears to be lightening  C/V: NSR  Pulm: Nonlabored breathing, no respiratory distress  Abd: soft, NT/ND  Extrem: WWP, no calf edema, SCDs in place      A/P: 73yFemale s/p above procedure  Diet: CLD, can ADAT  IVF: none needed  Pain/nausea control: standing tylenol, oxy  DVT ppx: sqh  Dispo plan: PACU to floor    D Team surgery  [id number]

## 2023-06-07 NOTE — PATIENT PROFILE ADULT - FALL HARM RISK - HARM RISK INTERVENTIONS

## 2023-06-07 NOTE — BRIEF OPERATIVE NOTE - NSICDXBRIEFPREOP_GEN_ALL_CORE_FT
PRE-OP DIAGNOSIS:  Invasive lobular carcinoma of right breast in female 07-Jun-2023 11:48:01  Elena Godoy

## 2023-06-07 NOTE — BRIEF OPERATIVE NOTE - NSICDXBRIEFPROCEDURE_GEN_ALL_CORE_FT
PROCEDURES:  Total mastectomy with sentinel lymph node biopsy 07-Jun-2023 11:44:54 bilateral Elena Gdooy

## 2023-06-07 NOTE — BRIEF OPERATIVE NOTE - NSICDXBRIEFPOSTOP_GEN_ALL_CORE_FT
POST-OP DIAGNOSIS:  Invasive lobular carcinoma of right breast in female 07-Jun-2023 11:48:21  Elena Godoy

## 2023-06-07 NOTE — BRIEF OPERATIVE NOTE - OPERATION/FINDINGS
Nuclear radiotracer and isosulfane blue injected bilaterally prior to surgery. Right mastectomy for invasive lobular carcinoma w/ sentinal lymph node biopsy. Left mastectomy prophylactic w/ sentinal lymph node biopsy. Pt was very oozy throughout case. Hemostasis achieved and drains placed bilaterally. SLN w/o evidence of disease based on frozen section.

## 2023-06-08 ENCOUNTER — TRANSCRIPTION ENCOUNTER (OUTPATIENT)
Age: 73
End: 2023-06-08

## 2023-06-08 LAB
ANION GAP SERPL CALC-SCNC: 9 MMOL/L — SIGNIFICANT CHANGE UP (ref 7–14)
APTT BLD: 30.6 SEC — SIGNIFICANT CHANGE UP (ref 27–36.3)
BLD GP AB SCN SERPL QL: NEGATIVE — SIGNIFICANT CHANGE UP
BUN SERPL-MCNC: 17 MG/DL — SIGNIFICANT CHANGE UP (ref 7–23)
CALCIUM SERPL-MCNC: 9 MG/DL — SIGNIFICANT CHANGE UP (ref 8.4–10.5)
CHLORIDE SERPL-SCNC: 102 MMOL/L — SIGNIFICANT CHANGE UP (ref 98–107)
CO2 SERPL-SCNC: 26 MMOL/L — SIGNIFICANT CHANGE UP (ref 22–31)
CREAT SERPL-MCNC: 1.35 MG/DL — HIGH (ref 0.5–1.3)
EGFR: 41 ML/MIN/1.73M2 — LOW
GLUCOSE SERPL-MCNC: 106 MG/DL — HIGH (ref 70–99)
HCT VFR BLD CALC: 22.2 % — LOW (ref 34.5–45)
HCT VFR BLD CALC: 22.4 % — LOW (ref 34.5–45)
HGB BLD-MCNC: 7.4 G/DL — LOW (ref 11.5–15.5)
HGB BLD-MCNC: 7.6 G/DL — LOW (ref 11.5–15.5)
INR BLD: 1.19 RATIO — HIGH (ref 0.88–1.16)
MAGNESIUM SERPL-MCNC: 2.2 MG/DL — SIGNIFICANT CHANGE UP (ref 1.6–2.6)
MCHC RBC-ENTMCNC: 32.5 PG — SIGNIFICANT CHANGE UP (ref 27–34)
MCHC RBC-ENTMCNC: 33.3 GM/DL — SIGNIFICANT CHANGE UP (ref 32–36)
MCHC RBC-ENTMCNC: 33.6 PG — SIGNIFICANT CHANGE UP (ref 27–34)
MCHC RBC-ENTMCNC: 33.9 GM/DL — SIGNIFICANT CHANGE UP (ref 32–36)
MCV RBC AUTO: 97.4 FL — SIGNIFICANT CHANGE UP (ref 80–100)
MCV RBC AUTO: 99.1 FL — SIGNIFICANT CHANGE UP (ref 80–100)
NRBC # BLD: 0 /100 WBCS — SIGNIFICANT CHANGE UP (ref 0–0)
NRBC # BLD: 0 /100 WBCS — SIGNIFICANT CHANGE UP (ref 0–0)
NRBC # FLD: 0 K/UL — SIGNIFICANT CHANGE UP (ref 0–0)
NRBC # FLD: 0 K/UL — SIGNIFICANT CHANGE UP (ref 0–0)
PHOSPHATE SERPL-MCNC: 3.4 MG/DL — SIGNIFICANT CHANGE UP (ref 2.5–4.5)
PLATELET # BLD AUTO: 124 K/UL — LOW (ref 150–400)
PLATELET # BLD AUTO: 133 K/UL — LOW (ref 150–400)
POTASSIUM SERPL-MCNC: 4.2 MMOL/L — SIGNIFICANT CHANGE UP (ref 3.5–5.3)
POTASSIUM SERPL-SCNC: 4.2 MMOL/L — SIGNIFICANT CHANGE UP (ref 3.5–5.3)
PROTHROM AB SERPL-ACNC: 13.8 SEC — HIGH (ref 10.5–13.4)
RBC # BLD: 2.26 M/UL — LOW (ref 3.8–5.2)
RBC # BLD: 2.28 M/UL — LOW (ref 3.8–5.2)
RBC # FLD: 13.3 % — SIGNIFICANT CHANGE UP (ref 10.3–14.5)
RBC # FLD: 13.8 % — SIGNIFICANT CHANGE UP (ref 10.3–14.5)
RH IG SCN BLD-IMP: POSITIVE — SIGNIFICANT CHANGE UP
SODIUM SERPL-SCNC: 137 MMOL/L — SIGNIFICANT CHANGE UP (ref 135–145)
WBC # BLD: 5.47 K/UL — SIGNIFICANT CHANGE UP (ref 3.8–10.5)
WBC # BLD: 6.03 K/UL — SIGNIFICANT CHANGE UP (ref 3.8–10.5)
WBC # FLD AUTO: 5.47 K/UL — SIGNIFICANT CHANGE UP (ref 3.8–10.5)
WBC # FLD AUTO: 6.03 K/UL — SIGNIFICANT CHANGE UP (ref 3.8–10.5)

## 2023-06-08 RX ORDER — OLMESARTAN MEDOXOMIL 5 MG/1
1 TABLET, FILM COATED ORAL
Qty: 0 | Refills: 0 | DISCHARGE

## 2023-06-08 RX ORDER — ACETAMINOPHEN 500 MG
2 TABLET ORAL
Qty: 0 | Refills: 0 | DISCHARGE
Start: 2023-06-08

## 2023-06-08 RX ORDER — OXYCODONE HYDROCHLORIDE 5 MG/1
1 TABLET ORAL
Qty: 8 | Refills: 0
Start: 2023-06-08 | End: 2023-06-09

## 2023-06-08 RX ADMIN — Medication 1000 MILLIGRAM(S): at 23:27

## 2023-06-08 RX ADMIN — HEPARIN SODIUM 5000 UNIT(S): 5000 INJECTION INTRAVENOUS; SUBCUTANEOUS at 07:07

## 2023-06-08 RX ADMIN — HEPARIN SODIUM 5000 UNIT(S): 5000 INJECTION INTRAVENOUS; SUBCUTANEOUS at 16:37

## 2023-06-08 RX ADMIN — HEPARIN SODIUM 5000 UNIT(S): 5000 INJECTION INTRAVENOUS; SUBCUTANEOUS at 23:27

## 2023-06-08 NOTE — DISCHARGE NOTE NURSING/CASE MANAGEMENT/SOCIAL WORK - PATIENT PORTAL LINK FT
You can access the FollowMyHealth Patient Portal offered by [facility] by registering at the following website: [email or web address]. By joining [facility]’s FollowMyHealth portal, you will also be able to view your health information using other applications (apps) compatible with our system.

## 2023-06-08 NOTE — DISCHARGE NOTE PROVIDER - CARE PROVIDER_API CALL
Aldo Sainz  Surgery  [address] [place] [id number]  Phone: [phone]  Fax: [phone]  Follow Up Time: 1 week

## 2023-06-08 NOTE — DISCHARGE NOTE PROVIDER - NSDCQMSTROKE_NEU_ALL_CORE
Your CT scan of your head and cervical spine today was normal. The CXR was normal as well. Likely your headache and nausea are due to mild concussion. Keep your appointment with your PCP tomorrow for follow up or return to the ED for any new or worsening symptoms.
No

## 2023-06-08 NOTE — DISCHARGE NOTE PROVIDER - HOSPITAL COURSE
This patient is a 73F with right breast cancer who presented to [facility] on 6/7/23 for scheduled surgery. Patient taken to the OR by Dr. Sainz and underwent bilateral total mastectomy with bilateral sentinel LN biopsy. STACEY drain x 2. Patient tolerated operation well and there were no post-operative complications identified. Patient remained hemodynamically stable in the PACU and transferred to the surgical floor. Diet was restarted and advanced as tolerated. Pain control was transitioned from IV to PO pain meds. At this time, patient is currently ambulating, voiding, tolerating a regular diet. Pain well controlled on PO pain meds. Patient has been deemed stable for discharge home with follow up as an outpatient. This patient is a 73F with right breast cancer who presented to [facility] on 6/7/23 for scheduled surgery. Patient taken to the OR by Dr. Sainz and underwent bilateral total mastectomy with bilateral sentinel LN biopsy. STACEY drain x 2. Patient tolerated operation well. Patient remained hemodynamically stable in the PACU and transferred to the surgical floor. Post-operatively her hemoglobin was initially down trending. Hgb was 6.9 on POD 2 so patient received 1U PRBC with appropriate response. Hgb remained stable so decision was made to discharge the patient. Diet was restarted and advanced as tolerated. Pain control was transitioned from IV to PO pain meds. At this time, patient is currently ambulating, voiding, tolerating a regular diet. Pain well controlled on PO pain meds. Patient has been deemed stable for discharge home with follow up as an outpatient.

## 2023-06-08 NOTE — DISCHARGE NOTE PROVIDER - NSDCCPTREATMENT_GEN_ALL_CORE_FT
PRINCIPAL PROCEDURE  Procedure: Total mastectomy with sentinel lymph node biopsy  Findings and Treatment: bilateral

## 2023-06-08 NOTE — DISCHARGE NOTE NURSING/CASE MANAGEMENT/SOCIAL WORK - NSSCNAMETXT_GEN_ALL_CORE
[facility] [phone] initial visit will be day after discharge home. A nurse will call prior to the home visit.

## 2023-06-08 NOTE — DISCHARGE NOTE PROVIDER - NSDCCPCAREPLAN_GEN_ALL_CORE_FT
PRINCIPAL DISCHARGE DIAGNOSIS  Diagnosis: Breast CA  Assessment and Plan of Treatment: DRAIN CARE: Galo Carver drain (STACEY Drain) If you go home with a STACEY drain it is important that you measure and record the fluid that is in it (output) on the output record sheet. Please bring the output record sheet to your follow-up appointment. Keep the drain secured to your clothing with a safety pin at all times to avoid accidental displacement. Monitor the insertion site for redness, pain, swelling, or purulent drainage.  You may shower with the drain. Wash around the drain with soap and water, pat dry, and reapply dressing. If you noticed a sudden change in the color or amount of fluid in the drain, please contact your surgeon’s office.  Your drain will be removed at an outpatient follow up appointment.   WOUND CARE:  Keep surgical bra on at all times. Your dressings over your incision will be removed in the office. Please keep incisions clean and dry. Please do not Scrub or rub incisions. Do not use lotion or powder on incisions.   BATHING: You may shower and/or sponge bathe. You may use warm soapy water in the shower and rinse, pat dry.  ACTIVITY: No heavy lifting or straining. Otherwise, you may return to your usual level of physical activity. If you are taking narcotic pain medication DO NOT drive a car, operate machinery or make important decisions.  DIET: Return to your usual diet.  NOTIFY YOUR SURGEON IF YOU HAVE: any bleeding that does not stop, any pus draining from your wound(s), any fever (over 100.4 F) persistent nausea/vomiting, or if your pain is not controlled on your discharge pain medications, unable to urinate.  FOLLOW UP:  1. Please follow up with your primary care physician in one week regarding your hospitalization, bring copies of your discharge paperwork.  2. Please follow up with your surgeon, Dr. Sainz within 1-2 weeks after your discharge. You may call [phone] to make an appointment.     PRINCIPAL DISCHARGE DIAGNOSIS  Diagnosis: Breast CA  Assessment and Plan of Treatment: DRAIN CARE: Galo Carver drain (STACEY Drain) If you go home with a STACEY drain it is important that you measure and record the fluid that is in it (output) on the output record sheet. Please bring the output record sheet to your follow-up appointment. Keep the drain secured to your clothing with a safety pin at all times to avoid accidental displacement. Monitor the insertion site for redness, pain, swelling, or purulent drainage.  You may shower with the drain. Wash around the drain with soap and water, pat dry, and reapply dressing. If you noticed a sudden change in the color or amount of fluid in the drain, please contact your surgeon’s office.  Your drain will be removed at an outpatient follow up appointment.   WOUND CARE:  Keep surgical bra on at all times. Your dressings over your incision will be removed in the office. Please keep incisions clean and dry. Please do not Scrub or rub incisions. Do not use lotion or powder on incisions.   BATHING: You may shower and/or sponge bathe. You may use warm soapy water in the shower and rinse, pat dry.  ACTIVITY: No heavy lifting or straining. Otherwise, you may return to your usual level of physical activity. If you are taking narcotic pain medication DO NOT drive a car, operate machinery or make important decisions.  DIET: Return to your usual diet.  NOTIFY YOUR SURGEON IF YOU HAVE: any bleeding that does not stop, any pus draining from your wound(s), any fever (over 100.4 F) persistent nausea/vomiting, or if your pain is not controlled on your discharge pain medications, unable to urinate.  FOLLOW UP:  1. Please follow up with your primary care physician in one week regarding your hospitalization, bring copies of your discharge paperwork.  2. Please follow up with your surgeon, Dr. Sainz within 1-2 weeks after your discharge. You may call [phone] to make an appointment.      SECONDARY DISCHARGE DIAGNOSES  Diagnosis: Hypertension  Assessment and Plan of Treatment: HOLD (do not take) your blood pressure medication (olmesartan) upon discharge from the hospital. While in the hospital your blood pressure was lower than your normal, but was stable. Do not resume this medication until you see your primary care physician or surgeon in the office where they will check your blood pressure.  Please see your PCP within 1 week from discharge to have your blood pressure rechecked.    Diagnosis: Elevated serum creatinine  Assessment and Plan of Treatment: You were noted to have elevated serum creatinine after your surgery. This laboratory value is indicative of your kidney function. Please see your PCP or surgeon and have this laboratory value drawn and rechecked.

## 2023-06-08 NOTE — DISCHARGE NOTE PROVIDER - NSDCMRMEDTOKEN_GEN_ALL_CORE_FT
acetaminophen 500 mg oral tablet: 2 tab(s) orally every 6 hours  levothyroxine 88 mcg (0.088 mg) oral capsule: 1 cap(s) orally once a day  olmesartan 20 mg oral tablet: 1 tab(s) orally once a day   acetaminophen 500 mg oral tablet: 2 tab(s) orally every 6 hours  levothyroxine 88 mcg (0.088 mg) oral capsule: 1 cap(s) orally once a day  oxyCODONE 5 mg oral tablet: 1 tab(s) orally every 6 hours as needed for  severe pain MDD: 4

## 2023-06-09 LAB
ANION GAP SERPL CALC-SCNC: 7 MMOL/L — SIGNIFICANT CHANGE UP (ref 7–14)
BLD GP AB SCN SERPL QL: NEGATIVE — SIGNIFICANT CHANGE UP
BUN SERPL-MCNC: 16 MG/DL — SIGNIFICANT CHANGE UP (ref 7–23)
CALCIUM SERPL-MCNC: 8.7 MG/DL — SIGNIFICANT CHANGE UP (ref 8.4–10.5)
CHLORIDE SERPL-SCNC: 103 MMOL/L — SIGNIFICANT CHANGE UP (ref 98–107)
CO2 SERPL-SCNC: 27 MMOL/L — SIGNIFICANT CHANGE UP (ref 22–31)
CREAT SERPL-MCNC: 1.2 MG/DL — SIGNIFICANT CHANGE UP (ref 0.5–1.3)
EGFR: 47 ML/MIN/1.73M2 — LOW
GLUCOSE SERPL-MCNC: 102 MG/DL — HIGH (ref 70–99)
HCT VFR BLD CALC: 20.8 % — CRITICAL LOW (ref 34.5–45)
HCT VFR BLD CALC: 25.6 % — LOW (ref 34.5–45)
HGB BLD-MCNC: 6.9 G/DL — CRITICAL LOW (ref 11.5–15.5)
HGB BLD-MCNC: 8.5 G/DL — LOW (ref 11.5–15.5)
MAGNESIUM SERPL-MCNC: 2.2 MG/DL — SIGNIFICANT CHANGE UP (ref 1.6–2.6)
MCHC RBC-ENTMCNC: 32.6 PG — SIGNIFICANT CHANGE UP (ref 27–34)
MCHC RBC-ENTMCNC: 33 PG — SIGNIFICANT CHANGE UP (ref 27–34)
MCHC RBC-ENTMCNC: 33.2 GM/DL — SIGNIFICANT CHANGE UP (ref 32–36)
MCHC RBC-ENTMCNC: 33.2 GM/DL — SIGNIFICANT CHANGE UP (ref 32–36)
MCV RBC AUTO: 98.1 FL — SIGNIFICANT CHANGE UP (ref 80–100)
MCV RBC AUTO: 99.5 FL — SIGNIFICANT CHANGE UP (ref 80–100)
NRBC # BLD: 0 /100 WBCS — SIGNIFICANT CHANGE UP (ref 0–0)
NRBC # BLD: 0 /100 WBCS — SIGNIFICANT CHANGE UP (ref 0–0)
NRBC # FLD: 0 K/UL — SIGNIFICANT CHANGE UP (ref 0–0)
NRBC # FLD: 0 K/UL — SIGNIFICANT CHANGE UP (ref 0–0)
PHOSPHATE SERPL-MCNC: 2.8 MG/DL — SIGNIFICANT CHANGE UP (ref 2.5–4.5)
PLATELET # BLD AUTO: 113 K/UL — LOW (ref 150–400)
PLATELET # BLD AUTO: 113 K/UL — LOW (ref 150–400)
POTASSIUM SERPL-MCNC: 4 MMOL/L — SIGNIFICANT CHANGE UP (ref 3.5–5.3)
POTASSIUM SERPL-SCNC: 4 MMOL/L — SIGNIFICANT CHANGE UP (ref 3.5–5.3)
RBC # BLD: 2.09 M/UL — LOW (ref 3.8–5.2)
RBC # BLD: 2.61 M/UL — LOW (ref 3.8–5.2)
RBC # FLD: 13.8 % — SIGNIFICANT CHANGE UP (ref 10.3–14.5)
RBC # FLD: 15.3 % — HIGH (ref 10.3–14.5)
RH IG SCN BLD-IMP: POSITIVE — SIGNIFICANT CHANGE UP
SODIUM SERPL-SCNC: 137 MMOL/L — SIGNIFICANT CHANGE UP (ref 135–145)
WBC # BLD: 3.74 K/UL — LOW (ref 3.8–10.5)
WBC # BLD: 4.23 K/UL — SIGNIFICANT CHANGE UP (ref 3.8–10.5)
WBC # FLD AUTO: 3.74 K/UL — LOW (ref 3.8–10.5)
WBC # FLD AUTO: 4.23 K/UL — SIGNIFICANT CHANGE UP (ref 3.8–10.5)

## 2023-06-09 RX ADMIN — Medication 1000 MILLIGRAM(S): at 00:00

## 2023-06-09 RX ADMIN — Medication 1000 MILLIGRAM(S): at 06:47

## 2023-06-09 RX ADMIN — HEPARIN SODIUM 5000 UNIT(S): 5000 INJECTION INTRAVENOUS; SUBCUTANEOUS at 06:01

## 2023-06-09 RX ADMIN — HEPARIN SODIUM 5000 UNIT(S): 5000 INJECTION INTRAVENOUS; SUBCUTANEOUS at 17:21

## 2023-06-09 RX ADMIN — HEPARIN SODIUM 5000 UNIT(S): 5000 INJECTION INTRAVENOUS; SUBCUTANEOUS at 23:34

## 2023-06-09 RX ADMIN — Medication 88 MICROGRAM(S): at 06:01

## 2023-06-09 RX ADMIN — Medication 1000 MILLIGRAM(S): at 12:17

## 2023-06-09 RX ADMIN — Medication 1000 MILLIGRAM(S): at 06:01

## 2023-06-09 NOTE — PROVIDER CONTACT NOTE (CRITICAL VALUE NOTIFICATION) - RECOMMENDATIONS
consider giving a unit of blood. no blood consent present in chart provider to obtain consent. type and screen pending.

## 2023-06-10 VITALS
SYSTOLIC BLOOD PRESSURE: 133 MMHG | TEMPERATURE: 98 F | DIASTOLIC BLOOD PRESSURE: 89 MMHG | RESPIRATION RATE: 18 BRPM | HEART RATE: 83 BPM | OXYGEN SATURATION: 98 %

## 2023-06-10 LAB
ANION GAP SERPL CALC-SCNC: 12 MMOL/L — SIGNIFICANT CHANGE UP (ref 7–14)
BUN SERPL-MCNC: 19 MG/DL — SIGNIFICANT CHANGE UP (ref 7–23)
CALCIUM SERPL-MCNC: 9.4 MG/DL — SIGNIFICANT CHANGE UP (ref 8.4–10.5)
CHLORIDE SERPL-SCNC: 101 MMOL/L — SIGNIFICANT CHANGE UP (ref 98–107)
CO2 SERPL-SCNC: 26 MMOL/L — SIGNIFICANT CHANGE UP (ref 22–31)
CREAT SERPL-MCNC: 1.23 MG/DL — SIGNIFICANT CHANGE UP (ref 0.5–1.3)
EGFR: 46 ML/MIN/1.73M2 — LOW
GLUCOSE SERPL-MCNC: 100 MG/DL — HIGH (ref 70–99)
HCT VFR BLD CALC: 28.9 % — LOW (ref 34.5–45)
HGB BLD-MCNC: 9.3 G/DL — LOW (ref 11.5–15.5)
MAGNESIUM SERPL-MCNC: 2.2 MG/DL — SIGNIFICANT CHANGE UP (ref 1.6–2.6)
MCHC RBC-ENTMCNC: 32.2 GM/DL — SIGNIFICANT CHANGE UP (ref 32–36)
MCHC RBC-ENTMCNC: 32.3 PG — SIGNIFICANT CHANGE UP (ref 27–34)
MCV RBC AUTO: 100.3 FL — HIGH (ref 80–100)
NRBC # BLD: 0 /100 WBCS — SIGNIFICANT CHANGE UP (ref 0–0)
NRBC # FLD: 0 K/UL — SIGNIFICANT CHANGE UP (ref 0–0)
PHOSPHATE SERPL-MCNC: 3.1 MG/DL — SIGNIFICANT CHANGE UP (ref 2.5–4.5)
PLATELET # BLD AUTO: 141 K/UL — LOW (ref 150–400)
POTASSIUM SERPL-MCNC: 4.5 MMOL/L — SIGNIFICANT CHANGE UP (ref 3.5–5.3)
POTASSIUM SERPL-SCNC: 4.5 MMOL/L — SIGNIFICANT CHANGE UP (ref 3.5–5.3)
RBC # BLD: 2.88 M/UL — LOW (ref 3.8–5.2)
RBC # FLD: 15.9 % — HIGH (ref 10.3–14.5)
SODIUM SERPL-SCNC: 139 MMOL/L — SIGNIFICANT CHANGE UP (ref 135–145)
WBC # BLD: 4.91 K/UL — SIGNIFICANT CHANGE UP (ref 3.8–10.5)
WBC # FLD AUTO: 4.91 K/UL — SIGNIFICANT CHANGE UP (ref 3.8–10.5)

## 2023-06-10 RX ADMIN — Medication 88 MICROGRAM(S): at 06:00

## 2023-06-10 RX ADMIN — HEPARIN SODIUM 5000 UNIT(S): 5000 INJECTION INTRAVENOUS; SUBCUTANEOUS at 14:07

## 2023-06-10 RX ADMIN — Medication 1000 MILLIGRAM(S): at 07:04

## 2023-06-10 RX ADMIN — Medication 1000 MILLIGRAM(S): at 06:00

## 2023-06-10 RX ADMIN — Medication 1000 MILLIGRAM(S): at 14:06

## 2023-06-10 RX ADMIN — HEPARIN SODIUM 5000 UNIT(S): 5000 INJECTION INTRAVENOUS; SUBCUTANEOUS at 07:01

## 2023-06-10 NOTE — PROGRESS NOTE ADULT - ASSESSMENT
73y F w/ PMH of HTN, hypothyroid, and invasive lobular carcinoma of right breast s/p b/l mastectomy and SLN bx on 6/7.    Plan:  - AM CBC 6.9 -> will transfuse 1 unit  - monitor BP  - regular diet  - pain control PRN  - OOBAT  - Discharge planning    D team Surgery  [id number]
73y F w/ PMH of HTN, hypothyroid, and invasive lobular carcinoma of right breast s/p b/l mastectomy and SLN bx on 6/7.    Plan:  - AM CBC 9.3  - monitor BP  - regular diet  - pain control PRN  - OOBAT  - Discharge home today     D team Surgery  [id number]
73y F w/ PMH of HTN, hypothyroid, and invasive lobular carcinoma of right breast s/p b/l mastectomy and SLN bx on 6/7.    Plan:  - CBC w/ Hb drop - will repeat in 4 hr  - monitor BP - was a bit soft this AM  - regular diet  - pain control PRN  - OOBAT  - possible discharge today pending PM CBC    D team Surgery  [id number]

## 2023-06-10 NOTE — PROGRESS NOTE ADULT - SUBJECTIVE AND OBJECTIVE BOX
D TEAM SURGERY DAILY PROGRESS NOTE  Patient is a 73y old  Female who presents with a chief complaint of Breast cancer (08 Jun 2023 07:33)      SUBJECTIVE:     Interval Events: Patient is POD# 3. Received 1 unit PRBC yesterday for hgb of 6.9 post transfusion hgb 8.5    Patient seen and evaluated at bedside on AM rounds. Patient without complaints.        OBJECTIVE:  Vital Signs Last 24 Hrs  T(C): 36.7 (10 Titi 2023 05:57), Max: 36.9 (09 Jun 2023 11:30)  T(F): 98 (10 Titi 2023 05:57), Max: 98.4 (09 Jun 2023 11:30)  HR: 71 (10 Titi 2023 05:57) (64 - 73)  BP: 110/58 (10 Titi 2023 05:57) (103/47 - 119/52)  BP(mean): --  RR: 18 (10 Titi 2023 05:57) (18 - 18)  SpO2: 99% (10 Titi 2023 05:57) (99% - 100%)    Parameters below as of 10 Titi 2023 05:57  Patient On (Oxygen Delivery Method): room air      I&O's Detail    09 Jun 2023 07:01  -  10 Titi 2023 07:00  --------------------------------------------------------  IN:    Oral Fluid: 400 mL  Total IN: 400 mL    OUT:    Bulb (mL): 80 mL    Bulb (mL): 80 mL    Voided (mL): 900 mL  Total OUT: 1060 mL    Total NET: -660 mL          STANDING  acetaminophen     Tablet .. 1000 milliGRAM(s) Oral every 6 hours  heparin   Injectable 5000 Unit(s) SubCutaneous every 8 hours  levothyroxine 88 MICROGram(s) Oral daily  losartan 50 milliGRAM(s) Oral daily    PRN  oxyCODONE    IR 10 milliGRAM(s) Oral every 4 hours PRN Severe Pain (7 - 10)  oxyCODONE    IR 5 milliGRAM(s) Oral every 4 hours PRN Moderate Pain (4 - 6)      Labs:  139  |  101  |  19  ----------------------------<  100<H>    (06-10)  4.5   |  26  |  1.23            Ca    9.4      06-10  Mg    2.20  Phos  3.1                  EXAM:  Gen: NAD, resting comfortably in bed  Chest: Wearing a postop bra  Right: dressing w/ minimal strikethrough. STACEY w/ ss output  Left: dressing w/ some saturation. STACEY w/ ss output  Pulm: Nonlabored breathing, no respiratory distress  Extrem: WWP, no calf edema, SCDs in place  
Subjective:   Patient seen at bedside this AM. Pt reports no pain, feeling well overall. Some mild pain around drain sites.    Objective:  Vital Signs  T(C): 36.7 (06-08 @ 05:15), Max: 36.7 (06-08 @ 00:11)  HR: 67 (06-08 @ 05:15) (52 - 75)  BP: 94/41 (06-08 @ 05:15) (94/41 - 161/73)  RR: 18 (06-08 @ 05:15) (4 - 21)  SpO2: 100% (06-08 @ 05:15) (95% - 100%)  06-07-23 @ 07:01  -  06-08-23 @ 07:00  --------------------------------------------------------  IN:  Total IN: 0 mL    OUT:    Bulb (mL): 240 mL    Bulb (mL): 214 mL    Voided (mL): 600 mL  Total OUT: 1054 mL    Total NET: -1054 mL          Physical Exam:  Gen: NAD, resting comfortably in bed  Chest:  Right: dressing w/ minimal strikethrough. STACEY w/ ss output  Left: dressing w/ some saturation. STACEY w/ ss output  Pulm: Nonlabored breathing, no respiratory distress  Abd: soft, NT/ND  Extrem: WWP, no calf edema, SCDs in place    Labs:                        7.4    6.03  )-----------( 133      ( 08 Jun 2023 06:28 )             22.2   06-08    137  |  102  |  17  ----------------------------<  106<H>  4.2   |  26  |  1.35<H>    Ca    9.0      08 Jun 2023 06:28  Phos  3.4     06-08  Mg     2.20     06-08      CAPILLARY BLOOD GLUCOSE          Imaging:    
TEAM D Surgery Daily Progress Note  =====================================================    SUBJECTIVE: Patient seen and examined at bedside on AM rounds. Patient reports that they're feeling well. Feels comfortable going home today.  --------------------------------------------------------------------------------------  OBJECTIVE:    VITAL SIGNS:  Vital Signs Last 24 Hrs  T(C): 36.7 (09 Jun 2023 05:20), Max: 37.8 (08 Jun 2023 20:07)  T(F): 98.1 (09 Jun 2023 05:20), Max: 100.1 (08 Jun 2023 20:07)  HR: 64 (09 Jun 2023 05:20) (64 - 82)  BP: 102/44 (09 Jun 2023 05:20) (102/44 - 117/50)  BP(mean): --  RR: 18 (09 Jun 2023 05:20) (18 - 18)  SpO2: 100% (09 Jun 2023 05:20) (99% - 100%)    Parameters below as of 09 Jun 2023 05:20  Patient On (Oxygen Delivery Method): room air      --------------------------------------------------------------------------------------    EXAM:  Gen: NAD, resting comfortably in bed  Chest: Wearing a postop bra  Right: dressing w/ minimal strikethrough. STACEY w/ ss output  Left: dressing w/ some saturation. STACEY w/ ss output  Pulm: Nonlabored breathing, no respiratory distress  Extrem: WWP, no calf edema, SCDs in place    --------------------------------------------------------------------------------------    LABS:                        6.9    3.74  )-----------( 113      ( 09 Jun 2023 06:34 )             20.8     06-09    137  |  103  |  16  ----------------------------<  102<H>  4.0   |  27  |  1.20    Ca    8.7      09 Jun 2023 06:34  Phos  2.8     06-09  Mg     2.20     06-09      PT/INR - ( 08 Jun 2023 12:08 )   PT: 13.8 sec;   INR: 1.19 ratio         PTT - ( 08 Jun 2023 12:08 )  PTT:30.6 sec      --------------------------------------------------------------------------------------    INS AND OUTS:    06-08-23 @ 07:01  -  06-09-23 @ 07:00  --------------------------------------------------------  IN: 1170 mL / OUT: 740 mL / NET: 430 mL        --------------------------------------------------------------------------------------    MEDICATIONS:  MEDICATIONS  (STANDING):  acetaminophen     Tablet .. 1000 milliGRAM(s) Oral every 6 hours  heparin   Injectable 5000 Unit(s) SubCutaneous every 8 hours  levothyroxine 88 MICROGram(s) Oral daily  losartan 50 milliGRAM(s) Oral daily    MEDICATIONS  (PRN):  oxyCODONE    IR 5 milliGRAM(s) Oral every 4 hours PRN Moderate Pain (4 - 6)  oxyCODONE    IR 10 milliGRAM(s) Oral every 4 hours PRN Severe Pain (7 - 10)    --------------------------------------------------------------------------------------

## 2023-06-12 LAB — SURGICAL PATHOLOGY STUDY: SIGNIFICANT CHANGE UP

## 2023-06-19 ENCOUNTER — APPOINTMENT (OUTPATIENT)
Dept: SURGICAL ONCOLOGY | Facility: CLINIC | Age: 73
End: 2023-06-19
Payer: MEDICARE

## 2023-06-19 VITALS
DIASTOLIC BLOOD PRESSURE: 67 MMHG | BODY MASS INDEX: 24.83 KG/M2 | WEIGHT: 149 LBS | OXYGEN SATURATION: 96 % | HEART RATE: 75 BPM | HEIGHT: 65 IN | SYSTOLIC BLOOD PRESSURE: 125 MMHG

## 2023-06-19 PROCEDURE — 99024 POSTOP FOLLOW-UP VISIT: CPT

## 2023-06-20 NOTE — PHYSICAL EXAM
[Normal] : normal breast inspection and palpation of axillas [Normal Neck Lymph Nodes] : normal neck lymph nodes  [Normal Supraclavicular Lymph Nodes] : normal supraclavicular lymph nodes [Normal Groin Lymph Nodes] : normal groin lymph nodes [Normal Axillary Lymph Nodes] : normal axillary lymph nodes [Normal] : oriented to person, place and time, with appropriate affect [FreeTextEntry1] : GS present during exam  [de-identified] : No dominant masses on either side.  No axillary lymphadenopathy on either side.

## 2023-06-20 NOTE — ASSESSMENT
[FreeTextEntry1] : \brandon Chi will follow-up with us in 6 weeks.  She will also follow-up with Dr. Edwards regarding adjuvant therapy.

## 2023-06-20 NOTE — REASON FOR VISIT
[Breast Cancer] : breast cancer [Post-Op] : a post-op for [FreeTextEntry2] : s/p mastectomies 6/7/2023

## 2023-06-20 NOTE — CONSULT LETTER
[Dear  ___] : Dear  [unfilled], [Consult Letter:] : I had the pleasure of evaluating your patient, [unfilled]. [Please see my note below.] : Please see my note below. [Consult Closing:] : Thank you very much for allowing me to participate in the care of this patient.  If you have any questions, please do not hesitate to contact me. [Sincerely,] : Sincerely, [DrMagda  ___] : Dr. CARDOSO [FreeTextEntry2] : Jaquan Ambrose MD [FreeTextEntry3] : Aldo Sainz MD\par Surgical Oncology\par [facility]\par Office: [phone]\par Cell: [phone]\par

## 2023-06-27 ENCOUNTER — OUTPATIENT (OUTPATIENT)
Dept: OUTPATIENT SERVICES | Facility: HOSPITAL | Age: 73
LOS: 1 days | Discharge: HOME | End: 2023-06-27

## 2023-06-27 DIAGNOSIS — C50.919 MALIGNANT NEOPLASM OF UNSPECIFIED SITE OF UNSPECIFIED FEMALE BREAST: ICD-10-CM

## 2023-06-27 DIAGNOSIS — Z98.891 HISTORY OF UTERINE SCAR FROM PREVIOUS SURGERY: Chronic | ICD-10-CM

## 2023-06-27 DIAGNOSIS — Z98.890 OTHER SPECIFIED POSTPROCEDURAL STATES: Chronic | ICD-10-CM

## 2023-06-29 ENCOUNTER — APPOINTMENT (OUTPATIENT)
Age: 73
End: 2023-06-29
Payer: MEDICARE

## 2023-06-29 VITALS
RESPIRATION RATE: 17 BRPM | SYSTOLIC BLOOD PRESSURE: 145 MMHG | HEIGHT: 65 IN | WEIGHT: 152.56 LBS | BODY MASS INDEX: 25.42 KG/M2 | OXYGEN SATURATION: 99 % | DIASTOLIC BLOOD PRESSURE: 79 MMHG | HEART RATE: 70 BPM

## 2023-06-29 PROCEDURE — 99215 OFFICE O/P EST HI 40 MIN: CPT

## 2023-06-29 RX ORDER — CAPECITABINE 500 MG/1
500 TABLET ORAL TWICE DAILY
Qty: 84 | Refills: 5 | Status: ACTIVE | COMMUNITY
Start: 2023-06-29 | End: 1900-01-01

## 2023-06-29 RX ORDER — CAPECITABINE 150 MG/1
150 TABLET, FILM COATED ORAL
Qty: 28 | Refills: 5 | Status: ACTIVE | COMMUNITY
Start: 2023-06-29 | End: 1900-01-01

## 2023-06-29 NOTE — PHYSICAL EXAM
[Restricted in physically strenuous activity but ambulatory and able to carry out work of a light or sedentary nature] : Status 1- Restricted in physically strenuous activity but ambulatory and able to carry out work of a light or sedentary nature, e.g., light house work, office work [Thrush] : thrush [Normal] : normal appearance, no rash, nodules, vesicles, ulcers, erythema [de-identified] : bilateral mastectomy, no reconstruction

## 2023-07-05 ENCOUNTER — NON-APPOINTMENT (OUTPATIENT)
Age: 73
End: 2023-07-05

## 2023-07-06 ENCOUNTER — APPOINTMENT (OUTPATIENT)
Age: 73
End: 2023-07-06

## 2023-07-07 ENCOUNTER — LABORATORY RESULT (OUTPATIENT)
Age: 73
End: 2023-07-07

## 2023-07-11 ENCOUNTER — NON-APPOINTMENT (OUTPATIENT)
Age: 73
End: 2023-07-11

## 2023-07-31 ENCOUNTER — APPOINTMENT (OUTPATIENT)
Dept: SURGICAL ONCOLOGY | Facility: CLINIC | Age: 73
End: 2023-07-31
Payer: MEDICARE

## 2023-07-31 VITALS
WEIGHT: 155 LBS | SYSTOLIC BLOOD PRESSURE: 135 MMHG | RESPIRATION RATE: 16 BRPM | DIASTOLIC BLOOD PRESSURE: 79 MMHG | HEIGHT: 65 IN | HEART RATE: 64 BPM | BODY MASS INDEX: 25.83 KG/M2 | OXYGEN SATURATION: 99 %

## 2023-07-31 PROCEDURE — 99024 POSTOP FOLLOW-UP VISIT: CPT

## 2023-08-04 ENCOUNTER — LABORATORY RESULT (OUTPATIENT)
Age: 73
End: 2023-08-04

## 2023-08-05 NOTE — CONSULT LETTER
[Dear  ___] : Dear  [unfilled], [Consult Letter:] : I had the pleasure of evaluating your patient, [unfilled]. [Please see my note below.] : Please see my note below. [Consult Closing:] : Thank you very much for allowing me to participate in the care of this patient.  If you have any questions, please do not hesitate to contact me. [Sincerely,] : Sincerely, [DrMagda  ___] : Dr. CARDOSO [FreeTextEntry2] : Jaquan Ambrose MD [FreeTextEntry3] : Aldo Sainz MD\par Surgical Oncology\par [facility]\par Office: [phone]\par Cell: [phone]\par

## 2023-08-05 NOTE — PHYSICAL EXAM
[Normal] : well developed, well nourished, in no acute distress [FreeTextEntry1] : AB present during exam  [de-identified] : . bilateral mastectomies without reconstruction.  Incisions healing well with no evidence of infection, hematoma or seroma

## 2023-08-05 NOTE — HISTORY OF PRESENT ILLNESS
[de-identified] : Ms. ARTI BALDERRAMA is a 73 year old woman here today for a post-op visit  Referred by her PCP Dr. Jaquan Ambrose   She completed a bilateral screening mammogram on 3/30/22 which revealed suggestion of spiculated masses/architectural distortion in the outer central right breast.  Subcentimeter masses are seen in the lower central and lower inner right breast 4-5 cmfn.  Questionable architectural distortion in the central left breast 6 cmfn seen on CC view.  Diagnostic mammogram/sonogram is recommended as well as biopsy of the spiculated masses/architectural distortion in the outer central right breast (BIRADS 4).  Subsequent bilateral diagnostic mammogram and sonogram was performed on 5/3/22. 4 masses are seen in the right breast at 9:00, 12-14 cmfn are highly suggestive of malignancy.  Ultrasound-guided core biopsy of one of the masses is recommended (BIRADS 5).   Biopsy was performed on 22.  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  Her past medical history includes hypertension (on olmesartan), hypothyroid (on synthroid). Past surgical history of 1 . Arti denies any personal or family history of breast cancer, she does have a sister with Ovarian cancer at age 54. She reports daily caffeine intake with 1 cup of tea, denies any history of alcohol or tobacco use.   2022: She denies palpable breast masses, nipple discharge, skin changes, inversion or breast pain. We discussed the need for staging breast MRI, and CT scans of the chest, abdomen, and pelvis.  Arti will follow-up with me upon completion of the imaging to discuss treatment options including breast conservation versus mastectomy.  She may also need a medical oncology evaluation pending the final results of her receptor studies.  [facility] pathology of biopsy slides: moderately differentiated invasive lobular carcinoma, (ER-/OR-/HER2-)  CT C/A/P from 2022: Multiple small retroperitoneal lymph nodes, the largest of which are considered mildly enlarged. This is nonspecific and may be chronic or reactive. Metastatic disease from breast carcinoma would be considered much less likely although clinical correlation is recommended. PET/CT may be of value if clinically warranted.  Bilateral MR of breasts on 2022: Conglomerate grouping of small irregular masses in the posterior lower outer right breast spanning 3.3 cm, corresponding to a site of biopsy-proven malignancy with contiguous enhancing foci and branch nonmass enhancement extending an additional 5.5 cm anterior to the conglomerate masses, concerning for additional disease. If breast conservation is considered, MRI guided biopsy of a more anterior aspect of this nonmass enhancement is suggested.  0.7 cm ovoid enhancing mass in the lower outer right breast anterior depth, approximately 3.5 cm posterior to the nipple which is indeterminate. If breast conservation is considered, MRI guided biopsy is recommended. No suspicious enhancing findings in the left breast. No lymphadenopathy.  2022: Arti returns today to discuss her recent imaging in details and discuss next steps.   She consulted with Dr. Marina Alvarez from medical oncology on 22 who recommended neoadjuvant chemotherapy (ddAC-T).    PET/CT 22- minimally FDG-avid subcentimeter soft tissue densities in the right breast.  Few small mildly FDG-avid lymph nodes in bilateral axillary and retroperitoneal regions and in the retroperitoneum, not significantly changed in size of number compared to CT dated 22, and nonspecific.  As lobular breast carcinoma typically has low metabolic activity, further characterization may be performed with FES/PET/CT ([place]).  Axillary/retropectoral lymph nodes may be further evaluated with ultrasound and percutaneous needle biopsy as indicated.   Pelvis US 22- small fluid within the endometrial cavity which likely accounts for most of the apparent thickening of th endometrium on CT.  Possible intramural/submucosal leiomyoma with no corresponding abnormality on PET.  FOllow up endovaginal pelvic sonography in 6 months to one year can be performed.    She completed cycle #1 AC/pembrolizumab on 22.  She completed her last dose of paclitazel on 3/16/23.   4/10/23- Arti doing well over all. She completed chemo therapy and will undergo repeat imaging. We discussed the options of breast conservation and mastectomy.  Arti wishes to undergo a mastectomy given the initial multifocal disease.  She is also considering a prophylactic left mastectomy as well.  She is deferring breast reconstruction.  We are arranging re-staging imaging.  Arti will also get genetic testing.    Genetics 2023 with negative findings   B/L diagnostic mammo/sono 2023- No mammographic or sonographic evidence of malignancy in either breast at this time. The previous abnormal findings in the posterior central outer right breast on mammography and 9:00 right breast on sonography are not seen at this time. BI-RADS 6  CT C/A/P 2023 - No evidence of metastatic disease. Small pericardial effusion is again appreciated  Breast MRI 2023- Near complete imaging response post neoadjuvant chemotherapy, as described above. Few residual foci at the site of known index malignancy in the posterior lower outer right breast and extending anteriorly from the site of known second site of malignancy in the lower outer right breast. Appropriate clinical, surgical and oncological management of the known right breast malignancies are recommended. Previously reported enhancing mass in the lower outer anterior right breast is no longer seen. Reportedly, this was biopsied on 2022 revealing intraductal papilloma. The susceptibility artifact from biopsy marker is seen in the lower slightly inner right breast. Surgical consultation for the known right breast papilloma.  2023- Arti returns to discuss surgical planning, she is tentatively scheduled for B/L mastectomies next week on 2023. We discussed the risks, benefits and alternatives of the bilateral mastectomies with sentinel lymph node dissections we also discussed post operative expectations and possible complications.  Arti expresses understanding and agrees to proceed.  23: Arti is s/p B/L mastectomies & B/L SLNB on 2023, path: left breast with benign path and 0/2 LN involved. Right breast w/ residual multifocal moderately differentiated invasive lobular carcinoma, 6 mm, LCIS, no LVI seen, margins negative, 0/7 LN involved, ER-/OR-/HER2-, ypT1b pN0. Arti states she is feeling well, denies fever and chill. Drains are with minimal output. Arti will follow up with Dr. Alvarez.   Arti saw Dr. Alvarez on 23 and consented to adjuvant Capecitabine.  23- 6 week follow up.  Arti has started Capecitabine and is tolerating it well thus far.  Her incisions are healing well with no evidence of infection or hematoma.  She would like to start applying topical scar creams and is interested in mastectomy bras and prosthetics.

## 2023-08-11 ENCOUNTER — APPOINTMENT (OUTPATIENT)
Age: 73
End: 2023-08-11
Payer: MEDICARE

## 2023-08-11 VITALS
HEART RATE: 61 BPM | BODY MASS INDEX: 25.71 KG/M2 | SYSTOLIC BLOOD PRESSURE: 141 MMHG | DIASTOLIC BLOOD PRESSURE: 80 MMHG | OXYGEN SATURATION: 99 % | WEIGHT: 154.32 LBS | RESPIRATION RATE: 17 BRPM | TEMPERATURE: 97.9 F | HEIGHT: 65 IN

## 2023-08-11 PROCEDURE — 99214 OFFICE O/P EST MOD 30 MIN: CPT

## 2023-08-11 NOTE — HISTORY OF PRESENT ILLNESS
[Disease: _____________________] : Disease: [unfilled] [de-identified] : 73F,originally from [place], with PMX HTN, hypothyroidism, s/p , referred for medical oncology consultation of right breast, ER/TX/HER2 negative mixed poorly differentiated ductal/lobular carcinoma.  CASE SYNOPSIS: 3/30/22: Bilateral screening mammogram-spiculated masses/architectural distortion in the outer central right breast.  Subcentimeter masses seen in the lower central and lower inner right breast N4-5 cm.  Questionable architectural distortion in the central left breast, N 6 cm seen on cc view.  5/3/2022 diagnostic mammography and breast ultrasound; 4 multiple spiculated masses seen in the right breast at 9:00, N 12-14 cm, highly suggestive of malignancy, for which US guided core biopsy recommended.  2022-US core biopsy right breast mass; pathology consistent with invasive, mixed poorly differentiated ductal/lobular carcinoma, ER, TX, HER2 negative.  2022: CT C/A/P-multiple, small retroperitoneal lymph nodes, considered mildly enlarged, nonspecific, chronic/reactive, less likely metastatic disease from breast cancer.  2022: Bilateral breast MRI: Conglomerate grouping of small irregular masses in the posterior lower outer right breast spanning 3.3 cm corresponding to the site of biopsy-proven malignancy with contiguous enhancing foci and branched non-mass enhancement extending an additional 5.5 cm anterior to the conglomerate masses, concerning for additional disease.  MRI guided biopsy of a more anterior aspect of this non-mass enhancement is suggested if breast conservation is considered. 0.7 cm ovoid enhancing mass in the lower outer right breast anterior depth, approximately 3.5 cm posterior to the nipple, indeterminate.  MRI guided biopsy recommended No suspicious enhancing findings in the left breast.  No lymphadenopathy.  2022-MRI guided vacuum-assisted core needle biopsy; pathology: RIGHT Site A (outer lower region): Invasive lobular carcinoma, classic type, Brumley score 6/9, invasive tumor present in several small foci, all measuring < 0.1 cm, LCIS classic type, Site B (anterior outer lower region): Intraductal papilloma with adjacent foci of columnar cell change, apocrine metaplasia, microcyst formation and simple adenosis.  2022: PET scan-preliminary report negative  7/15/63-Fpetmd-f-Port placement left chest wall  22- cycle #1 AC/ pembrolizumab  10/20/22- 3/16/23 completes paclitaxel / pembrolizumab  2023 bilateral modified radical mastectomy; surgical pathology: RIGHT: Multifocal residual invasive moderately differentiated lobular carcinoma, 6 mm, located more than 2 mm from margins, no LVI, + classic LCIS, 0/3 SLN involvement; the overall invasive carcinoma cellularity<10%, ER/TX negative, HER2 negative, concordant (pT1b, pN 0) LEFT: Calcified fibroadenoma; 0/2 SLN involvement  2023–starts C1D1 adjuvant capecitabine 1500 mg BID  -23- C2D1 capecitabine  [de-identified] : Moderately differentiated invasive lobular carcinoma [de-identified] : ER negative, NJ negative, HER2 negative [FreeTextEntry1] : Neoadjuvant systemic chemotherapy [de-identified] : Last seen in the office 6 weeks ago; review of final surgical pathology from radical mastectomy on 6/7/2023 consistent with residual invasive lobular carcinoma.  Patient started adjuvant capecitabine on 7/6/2023 and is currently in the middle of the second cycle of treatment, tolerating it well with minimal side effects.  She complains of fatigue, but denies GI symptoms.  Hematologic profile consistent with mild anemia and thrombocytopenia.  Denies recent infections or hospitalizations.  Her appetite is fair, but she has not lost weight. Accompanied by her friend, Rosemarie Rowan.

## 2023-08-11 NOTE — RESULTS/DATA
[FreeTextEntry1] : Blood work results, imaging studies, and pathology reports reviewed in detail with patient .\par  \par

## 2023-08-11 NOTE — ASSESSMENT
[FreeTextEntry1] : Ms. BALDERRAMA 's questions were answered to her satisfaction.  She  expressed her  understanding and willingness to comply with the above recommendations, and  will return to the office in 6 weeks.

## 2023-08-11 NOTE — PHYSICAL EXAM
[Restricted in physically strenuous activity but ambulatory and able to carry out work of a light or sedentary nature] : Status 1- Restricted in physically strenuous activity but ambulatory and able to carry out work of a light or sedentary nature, e.g., light house work, office work [Thrush] : thrush [Normal] : normal appearance, no rash, nodules, vesicles, ulcers, erythema [de-identified] : bilateral mastectomy, no reconstruction

## 2023-09-20 ENCOUNTER — LABORATORY RESULT (OUTPATIENT)
Age: 73
End: 2023-09-20

## 2023-09-20 ENCOUNTER — NON-APPOINTMENT (OUTPATIENT)
Age: 73
End: 2023-09-20

## 2023-09-21 ENCOUNTER — OUTPATIENT (OUTPATIENT)
Dept: OUTPATIENT SERVICES | Facility: HOSPITAL | Age: 73
LOS: 1 days | Discharge: HOME | End: 2023-09-21

## 2023-09-21 DIAGNOSIS — Z98.890 OTHER SPECIFIED POSTPROCEDURAL STATES: Chronic | ICD-10-CM

## 2023-09-21 DIAGNOSIS — Z98.891 HISTORY OF UTERINE SCAR FROM PREVIOUS SURGERY: Chronic | ICD-10-CM

## 2023-09-21 DIAGNOSIS — C50.919 MALIGNANT NEOPLASM OF UNSPECIFIED SITE OF UNSPECIFIED FEMALE BREAST: ICD-10-CM

## 2023-09-25 ENCOUNTER — APPOINTMENT (OUTPATIENT)
Dept: INFUSION THERAPY | Facility: HOSPITAL | Age: 73
End: 2023-09-25

## 2023-10-16 NOTE — ASU PATIENT PROFILE, ADULT - TEACHING/LEARNING FACTORS IMPACT ABILITY TO LEARN
Clinician called patient to discuss bariatric feedback. Clinician informed patient that the recommendation as of now is to engage in 6-8 sessions with [facility], continue seeing the nutritionist, and attend the support group/educational classes. Clinician discussed the patients positive change towards her eating habits and weight loss. Clinician informed patient that her supervisor would be speaking with Dr. Keo Estrada for consultation regarding the decision of the bariatric surgery. Patient did not have any questions or concerns and scheduled an appointment with the [facility] for 10/24.
none

## 2023-10-23 ENCOUNTER — RESULT REVIEW (OUTPATIENT)
Age: 73
End: 2023-10-23

## 2023-10-23 ENCOUNTER — APPOINTMENT (OUTPATIENT)
Dept: HEMATOLOGY ONCOLOGY | Facility: CLINIC | Age: 73
End: 2023-10-23
Payer: MEDICARE

## 2023-10-23 ENCOUNTER — APPOINTMENT (OUTPATIENT)
Dept: SURGICAL ONCOLOGY | Facility: CLINIC | Age: 73
End: 2023-10-23
Payer: MEDICARE

## 2023-10-23 VITALS
TEMPERATURE: 97.1 F | SYSTOLIC BLOOD PRESSURE: 148 MMHG | HEIGHT: 65 IN | OXYGEN SATURATION: 99 % | BODY MASS INDEX: 25.66 KG/M2 | HEART RATE: 95 BPM | WEIGHT: 153.99 LBS | DIASTOLIC BLOOD PRESSURE: 82 MMHG | RESPIRATION RATE: 17 BRPM

## 2023-10-23 VITALS
DIASTOLIC BLOOD PRESSURE: 70 MMHG | WEIGHT: 154 LBS | HEART RATE: 64 BPM | BODY MASS INDEX: 25.66 KG/M2 | OXYGEN SATURATION: 98 % | SYSTOLIC BLOOD PRESSURE: 148 MMHG | HEIGHT: 65 IN

## 2023-10-23 DIAGNOSIS — C50.911 MALIGNANT NEOPLASM OF UNSPECIFIED SITE OF RIGHT FEMALE BREAST: ICD-10-CM

## 2023-10-23 LAB
ALBUMIN SERPL ELPH-MCNC: 4.4 G/DL
ALP BLD-CCNC: 102 U/L
ALT SERPL-CCNC: 15 U/L
ANION GAP SERPL CALC-SCNC: 12 MMOL/L
AST SERPL-CCNC: 26 U/L
BASOPHILS # BLD AUTO: 0.02 K/UL — SIGNIFICANT CHANGE UP (ref 0–0.2)
BASOPHILS # BLD AUTO: 0.02 K/UL — SIGNIFICANT CHANGE UP (ref 0–0.2)
BASOPHILS NFR BLD AUTO: 0.5 % — SIGNIFICANT CHANGE UP (ref 0–2)
BASOPHILS NFR BLD AUTO: 0.5 % — SIGNIFICANT CHANGE UP (ref 0–2)
BILIRUB SERPL-MCNC: 0.6 MG/DL
BUN SERPL-MCNC: 21 MG/DL
CALCIUM SERPL-MCNC: 10.1 MG/DL
CHLORIDE SERPL-SCNC: 100 MMOL/L
CO2 SERPL-SCNC: 26 MMOL/L
CREAT SERPL-MCNC: 1.31 MG/DL
EGFR: 42 ML/MIN/1.73M2
EOSINOPHIL # BLD AUTO: 0.15 K/UL — SIGNIFICANT CHANGE UP (ref 0–0.5)
EOSINOPHIL # BLD AUTO: 0.15 K/UL — SIGNIFICANT CHANGE UP (ref 0–0.5)
EOSINOPHIL NFR BLD AUTO: 3.9 % — SIGNIFICANT CHANGE UP (ref 0–6)
EOSINOPHIL NFR BLD AUTO: 3.9 % — SIGNIFICANT CHANGE UP (ref 0–6)
GLUCOSE SERPL-MCNC: 76 MG/DL
HCT VFR BLD CALC: 29.4 % — LOW (ref 34.5–45)
HCT VFR BLD CALC: 29.4 % — LOW (ref 34.5–45)
HGB BLD-MCNC: 9.9 G/DL — LOW (ref 11.5–15.5)
HGB BLD-MCNC: 9.9 G/DL — LOW (ref 11.5–15.5)
IMM GRANULOCYTES NFR BLD AUTO: 0.3 % — SIGNIFICANT CHANGE UP (ref 0–0.9)
IMM GRANULOCYTES NFR BLD AUTO: 0.3 % — SIGNIFICANT CHANGE UP (ref 0–0.9)
LYMPHOCYTES # BLD AUTO: 1.96 K/UL — SIGNIFICANT CHANGE UP (ref 1–3.3)
LYMPHOCYTES # BLD AUTO: 1.96 K/UL — SIGNIFICANT CHANGE UP (ref 1–3.3)
LYMPHOCYTES # BLD AUTO: 50.4 % — HIGH (ref 13–44)
LYMPHOCYTES # BLD AUTO: 50.4 % — HIGH (ref 13–44)
MCHC RBC-ENTMCNC: 33.7 G/DL — SIGNIFICANT CHANGE UP (ref 32–36)
MCHC RBC-ENTMCNC: 33.7 G/DL — SIGNIFICANT CHANGE UP (ref 32–36)
MCHC RBC-ENTMCNC: 35.4 PG — HIGH (ref 27–34)
MCHC RBC-ENTMCNC: 35.4 PG — HIGH (ref 27–34)
MCV RBC AUTO: 105 FL — HIGH (ref 80–100)
MCV RBC AUTO: 105 FL — HIGH (ref 80–100)
MONOCYTES # BLD AUTO: 0.65 K/UL — SIGNIFICANT CHANGE UP (ref 0–0.9)
MONOCYTES # BLD AUTO: 0.65 K/UL — SIGNIFICANT CHANGE UP (ref 0–0.9)
MONOCYTES NFR BLD AUTO: 16.7 % — HIGH (ref 2–14)
MONOCYTES NFR BLD AUTO: 16.7 % — HIGH (ref 2–14)
NEUTROPHILS # BLD AUTO: 1.1 K/UL — LOW (ref 1.8–7.4)
NEUTROPHILS # BLD AUTO: 1.1 K/UL — LOW (ref 1.8–7.4)
NEUTROPHILS NFR BLD AUTO: 28.2 % — LOW (ref 43–77)
NEUTROPHILS NFR BLD AUTO: 28.2 % — LOW (ref 43–77)
NRBC # BLD: 0 /100 WBCS — SIGNIFICANT CHANGE UP (ref 0–0)
NRBC # BLD: 0 /100 WBCS — SIGNIFICANT CHANGE UP (ref 0–0)
PLATELET # BLD AUTO: 126 K/UL — LOW (ref 150–400)
PLATELET # BLD AUTO: 126 K/UL — LOW (ref 150–400)
POTASSIUM SERPL-SCNC: 4.4 MMOL/L
PROT SERPL-MCNC: 8.1 G/DL
RBC # BLD: 2.8 M/UL — LOW (ref 3.8–5.2)
RBC # BLD: 2.8 M/UL — LOW (ref 3.8–5.2)
RBC # FLD: 17.8 % — HIGH (ref 10.3–14.5)
RBC # FLD: 17.8 % — HIGH (ref 10.3–14.5)
SODIUM SERPL-SCNC: 138 MMOL/L
WBC # BLD: 3.89 K/UL — SIGNIFICANT CHANGE UP (ref 3.8–10.5)
WBC # BLD: 3.89 K/UL — SIGNIFICANT CHANGE UP (ref 3.8–10.5)
WBC # FLD AUTO: 3.89 K/UL — SIGNIFICANT CHANGE UP (ref 3.8–10.5)
WBC # FLD AUTO: 3.89 K/UL — SIGNIFICANT CHANGE UP (ref 3.8–10.5)

## 2023-10-23 PROCEDURE — 99214 OFFICE O/P EST MOD 30 MIN: CPT

## 2024-01-19 ENCOUNTER — OUTPATIENT (OUTPATIENT)
Dept: OUTPATIENT SERVICES | Facility: HOSPITAL | Age: 74
LOS: 1 days | Discharge: HOME | End: 2024-01-19

## 2024-01-19 DIAGNOSIS — C50.919 MALIGNANT NEOPLASM OF UNSPECIFIED SITE OF UNSPECIFIED FEMALE BREAST: ICD-10-CM

## 2024-01-19 DIAGNOSIS — Z98.890 OTHER SPECIFIED POSTPROCEDURAL STATES: Chronic | ICD-10-CM

## 2024-01-19 DIAGNOSIS — Z98.891 HISTORY OF UTERINE SCAR FROM PREVIOUS SURGERY: Chronic | ICD-10-CM

## 2024-01-21 NOTE — OB HISTORY
- Type II diabetes mellitus noted; a1c noted to be greater than 10   - On insulin sliding scale with q6 NPH   - Monitor fingersticks while on tube feeds  - endocrine recs appreciated for uncontrolled DM [Currently In Menopause] : currently in menopause

## 2024-01-22 ENCOUNTER — RESULT REVIEW (OUTPATIENT)
Age: 74
End: 2024-01-22

## 2024-01-22 ENCOUNTER — APPOINTMENT (OUTPATIENT)
Dept: HEMATOLOGY ONCOLOGY | Facility: CLINIC | Age: 74
End: 2024-01-22
Payer: MEDICARE

## 2024-01-22 VITALS
RESPIRATION RATE: 16 BRPM | WEIGHT: 158.73 LBS | DIASTOLIC BLOOD PRESSURE: 81 MMHG | BODY MASS INDEX: 26.45 KG/M2 | OXYGEN SATURATION: 97 % | HEART RATE: 71 BPM | HEIGHT: 65 IN | TEMPERATURE: 97.3 F | SYSTOLIC BLOOD PRESSURE: 162 MMHG

## 2024-01-22 LAB
25(OH)D3 SERPL-MCNC: 38.1 NG/ML
ALBUMIN SERPL ELPH-MCNC: 4.6 G/DL
ALP BLD-CCNC: 99 U/L
ALT SERPL-CCNC: 16 U/L
ANION GAP SERPL CALC-SCNC: 12 MMOL/L
AST SERPL-CCNC: 30 U/L
BASOPHILS # BLD AUTO: 0.01 K/UL — SIGNIFICANT CHANGE UP (ref 0–0.2)
BASOPHILS NFR BLD AUTO: 0.2 % — SIGNIFICANT CHANGE UP (ref 0–2)
BILIRUB SERPL-MCNC: 0.6 MG/DL
BUN SERPL-MCNC: 21 MG/DL
CALCIUM SERPL-MCNC: 9.7 MG/DL
CHLORIDE SERPL-SCNC: 101 MMOL/L
CO2 SERPL-SCNC: 27 MMOL/L
CREAT SERPL-MCNC: 1.19 MG/DL
EGFR: 47 ML/MIN/1.73M2
EOSINOPHIL # BLD AUTO: 0.11 K/UL — SIGNIFICANT CHANGE UP (ref 0–0.5)
EOSINOPHIL NFR BLD AUTO: 2.3 % — SIGNIFICANT CHANGE UP (ref 0–6)
GLUCOSE SERPL-MCNC: 95 MG/DL
HCT VFR BLD CALC: 32.2 % — LOW (ref 34.5–45)
HGB BLD-MCNC: 10.9 G/DL — LOW (ref 11.5–15.5)
IMM GRANULOCYTES NFR BLD AUTO: 0.2 % — SIGNIFICANT CHANGE UP (ref 0–0.9)
LYMPHOCYTES # BLD AUTO: 1.56 K/UL — SIGNIFICANT CHANGE UP (ref 1–3.3)
LYMPHOCYTES # BLD AUTO: 33.1 % — SIGNIFICANT CHANGE UP (ref 13–44)
MCHC RBC-ENTMCNC: 33.9 G/DL — SIGNIFICANT CHANGE UP (ref 32–36)
MCHC RBC-ENTMCNC: 34.8 PG — HIGH (ref 27–34)
MCV RBC AUTO: 102.9 FL — HIGH (ref 80–100)
MONOCYTES # BLD AUTO: 0.72 K/UL — SIGNIFICANT CHANGE UP (ref 0–0.9)
MONOCYTES NFR BLD AUTO: 15.3 % — HIGH (ref 2–14)
NEUTROPHILS # BLD AUTO: 2.31 K/UL — SIGNIFICANT CHANGE UP (ref 1.8–7.4)
NEUTROPHILS NFR BLD AUTO: 48.9 % — SIGNIFICANT CHANGE UP (ref 43–77)
NRBC # BLD: 0 /100 WBCS — SIGNIFICANT CHANGE UP (ref 0–0)
PLATELET # BLD AUTO: 128 K/UL — LOW (ref 150–400)
POTASSIUM SERPL-SCNC: 4.4 MMOL/L
PROT SERPL-MCNC: 8.2 G/DL
RBC # BLD: 3.13 M/UL — LOW (ref 3.8–5.2)
RBC # FLD: 13.7 % — SIGNIFICANT CHANGE UP (ref 10.3–14.5)
SODIUM SERPL-SCNC: 140 MMOL/L
WBC # BLD: 4.72 K/UL — SIGNIFICANT CHANGE UP (ref 3.8–10.5)
WBC # FLD AUTO: 4.72 K/UL — SIGNIFICANT CHANGE UP (ref 3.8–10.5)

## 2024-01-22 PROCEDURE — 99214 OFFICE O/P EST MOD 30 MIN: CPT

## 2024-01-22 NOTE — HISTORY OF PRESENT ILLNESS
[Disease: _____________________] : Disease: [unfilled] [de-identified] : 74F,originally from [place], with PMX HTN, hypothyroidism, s/p , referred for medical oncology consultation of right breast, ER/DC/HER2 negative mixed poorly differentiated ductal/lobular carcinoma.  CASE SYNOPSIS: 3/30/22: Bilateral screening mammogram-spiculated masses/architectural distortion in the outer central right breast.  Subcentimeter masses seen in the lower central and lower inner right breast N4-5 cm.  Questionable architectural distortion in the central left breast, N 6 cm seen on cc view.  5/3/2022 diagnostic mammography and breast ultrasound; 4 multiple spiculated masses seen in the right breast at 9:00, N 12-14 cm, highly suggestive of malignancy, for which US guided core biopsy recommended.  2022-US core biopsy right breast mass; pathology consistent with invasive, mixed poorly differentiated ductal/lobular carcinoma, ER, DC, HER2 negative.  2022: CT C/A/P-multiple, small retroperitoneal lymph nodes, considered mildly enlarged, nonspecific, chronic/reactive, less likely metastatic disease from breast cancer.  2022: Bilateral breast MRI: Conglomerate grouping of small irregular masses in the posterior lower outer right breast spanning 3.3 cm corresponding to the site of biopsy-proven malignancy with contiguous enhancing foci and branched non-mass enhancement extending an additional 5.5 cm anterior to the conglomerate masses, concerning for additional disease.  MRI guided biopsy of a more anterior aspect of this non-mass enhancement is suggested if breast conservation is considered. 0.7 cm ovoid enhancing mass in the lower outer right breast anterior depth, approximately 3.5 cm posterior to the nipple, indeterminate.  MRI guided biopsy recommended No suspicious enhancing findings in the left breast.  No lymphadenopathy.  2022-MRI guided vacuum-assisted core needle biopsy; pathology: RIGHT Site A (outer lower region): Invasive lobular carcinoma, classic type, Crenshaw score 6/9, invasive tumor present in several small foci, all measuring < 0.1 cm, LCIS classic type, Site B (anterior outer lower region): Intraductal papilloma with adjacent foci of columnar cell change, apocrine metaplasia, microcyst formation and simple adenosis.  2022: PET scan-preliminary report negative  7/15/92-Sunzyg-q-Port placement left chest wall  22- cycle #1 AC/ pembrolizumab  10/20/22- 3/16/23 completes paclitaxel / pembrolizumab  2023- [facility]- negative.  2023 bilateral modified radical mastectomy; surgical pathology: RIGHT: Multifocal residual invasive moderately differentiated lobular carcinoma, 6 mm, located more than 2 mm from margins, no LVI, + classic LCIS, 0/3 SLN involvement; the overall invasive carcinoma cellularity<10%, ER/DC negative, HER2 negative, concordant (pT1b, pN 0) LEFT: Calcified fibroadenoma; 0/2 SLN involvement  - -adjuvant capecitabine 1500 mg BID x 6 cycles  [de-identified] : Moderately differentiated invasive lobular carcinoma [de-identified] : ER negative, TX negative, HER2 negative [FreeTextEntry1] : Neoadjuvant systemic chemotherapy [de-identified] : Ms. BALDERRAMA presents today for follow-up.  She received adjuvant capecitabine for most of the past 6-month (completed 6 cycles in December 2023).  Endorses mild arthralgia and mild fatigue and peripheral sensory neuropathy.  Alopecia has resolved and denies headaches, change in weight .  Physical examination showed no chest wall lesions.  Denies new constitutional symptoms.  Medication list reviewed; she is off antiemetics and continues treatment with levothyroxine.  Denies recent COVID infection; did not received vaccination for flu and herpes this year.  Most recent blood work from 10/23/2023 shows WBC, persistent anemia and thrombocytopenia.

## 2024-01-22 NOTE — REVIEW OF SYSTEMS
[Fatigue] : fatigue [Recent Change In Weight] : ~T recent weight change [Constipation] : constipation [FreeTextEntry2] : + alopecia; losing weight [Negative] : Constitutional

## 2024-01-22 NOTE — ASSESSMENT
[FreeTextEntry1] : Ms. BALDERRAMA 's questions were answered to her satisfaction.  She  expressed her  understanding and willingness to comply with the above recommendations, and  will return to the office in 4-6 months.

## 2024-01-22 NOTE — PHYSICAL EXAM
[Restricted in physically strenuous activity but ambulatory and able to carry out work of a light or sedentary nature] : Status 1- Restricted in physically strenuous activity but ambulatory and able to carry out work of a light or sedentary nature, e.g., light house work, office work [Thrush] : thrush [Normal] : normal appearance, no rash, nodules, vesicles, ulcers, erythema [de-identified] : bilateral mastectomy, no reconstruction

## 2024-03-25 NOTE — PATIENT PROFILE ADULT - VISION (WITH CORRECTIVE LENSES IF THE PATIENT USUALLY WEARS THEM):
[facility]   Physical Therapy Treatment Note     Name: Sydnee [facility] Number: [id number]    Therapy Diagnosis:   Encounter Diagnoses   Name Primary?    Acute left-sided low back pain without sciatica Yes    Hip pain, left        Physician: Ru Aguila MD    Visit Date: 3/25/2024    Physician Orders: PT Eval and Treat  Medical Diagnosis from Referral: M25.552 (ICD-10-CM) - Hip pain, left   Evaluation Date: 2/8/2024  Authorization Period Expiration: 5/1/2024  Plan of Care Expiration: 5/3/2024  Progress note due: 4/4/2024  Visit # / Visits authorized: 7/12  FOTO: 2 / 3     Precautions: Standard     Time In: 1101  Time Out: 1159  Total Billable Time: 54 minutes    SUBJECTIVE     Pt reports: her back and  hip are feeling much better. She notices she can walk with less limping.  She was compliant with home exercise program.  Response to previous treatment: less back pain  Functional change: improved bending    Pain: 4/10  Location: Left low back, lateral hip, and groin     OBJECTIVE     Objective Measures updated at progress report unless specified.         Treatment     Sydnee received the treatments listed below:      Therapeutic Exercises to develop strength, endurance, posture, and core stabilization for 40 minutes including:  Posterior pelvic tilt, 2x15  5 sec holds first round 10 sec holds second round   Marching 2x10 with PPT   Bridge with green band cue on PPT 2x10   Standing hip abduction green band 2x10   Hip extension over table, 2x10  Deadlifts with 5.5# with pole, 3x8  T-ball roll outs for lumber flexion, 2x15 5 seconds  Thoracic extensions over foam roll, 2x10  Pallof press 1/2 plate, 2x15    Not today:  Seated hip External rotation, 2x10  Hip hike with ball on wall 3x8  Pretzel 2x10 L  Clamshells R, 2x10  Bent knee fall outs red band, 2x15 with PPT     Manual Therapy Techniques: Joint mobilizations and muscle energy techniques were applied to the: Lumbosacral spine for  14 minutes, including:  L long axis hip distraction, grade IV  L hip long axis manipulation  L hip true distraction mobilizations, grade III  L hip posterior glide with internal rotation mobilization with movement  PA mobilization L1-3 grade 3-4  Prone thoracic posterior to anterior glides, grade 4      Not today:  FMP for lumbar flexion  L lumbar gapping mobilizations, grade III-IV  L L4-5 closing METs       Patient Education and Home Exercises     Home Exercises Provided and Patient Education Provided     Education provided:   - ergonomics for household chores    Written Home Exercises Provided: Patient instructed to cont prior HEP. Exercises were reviewed and Sydnee was able to demonstrate them prior to the end of the session.  Sydnee demonstrated good  understanding of the education provided. See EMR under Patient Instructions for exercises provided during therapy sessions    ASSESSMENT     Sydnee again is progressing better as she is able to complete more of her household activities with less pain. She remains with limited lumbar/thoracic/hip mobility so manual techniques again targeted these body regions with exercises to facilitate her gains. She continues to require cueing for proper exercise technique and to maintain lumbar neutral as she has the tendency to overextend her lumbar spine with her exercises.     Sydnee Is progressing well towards her goals.   Pt prognosis is Fair.     Pt will continue to benefit from skilled outpatient physical therapy to address the deficits listed in the problem list box on initial evaluation, provide pt/family education and to maximize pt's level of independence in the home and community environment.     Pt's spiritual, cultural and educational needs considered and pt agreeable to plan of care and goals.     Anticipated barriers to physical therapy: none    Goals:   Short Term Goals: 3-4 weeks   Patient will have reduced pain complaints from 8/10 to less than or equal  to 4/10. (Met)  Patient will demonstrate increased AROM/PROM by approximately 25% to 50% of initial measurements. (Met)  Patient will demonstrate increased muscle strength of at least one-half grade as compared to the initial measurements. (Not Met - Progressing)     Long Term Goals: 6-8 weeks   Patient will have reduced pain complaints from 4/10 to less than or equal to 2/10. (Not Met - Progressing)  Patient will demonstrate increased AROM/PROM by approximately 75% to 100% of initial measurements. (Not Met - Progressing)  Patient will demonstrate increased muscle strength of at least one grade as compared to the initial measurements. (Not Met - Progressing)  Patient will be able to perform stair ascent and descent for approximately 2 flights of stairs with pain less than or equal to 2/10.(Not Met - Progressing)   Patient will improve Hip FOTO Intake score from 16% to greater than or equal to 49%. (Met)  Patient will be independent with their home exercise program. (Not Met - Progressing)    PLAN     Plan of care Certification: 2/8/2024 to 5/3/2024.    Low back pain with movement coordination deficits treatment based classification.    Papo Khan, PT   Board Certified Clinical Specialist in Orthopedic Physical Therapy  Board Certified Clinical Specialist in Sports Physical Therapy  Fellow, [employer] Manual Physical Therapists      Normal vision: sees adequately in most situations; can see medication labels, newsprint

## 2024-04-08 ENCOUNTER — APPOINTMENT (OUTPATIENT)
Dept: SURGICAL ONCOLOGY | Facility: CLINIC | Age: 74
End: 2024-04-08

## 2024-04-09 ENCOUNTER — APPOINTMENT (OUTPATIENT)
Dept: SURGICAL ONCOLOGY | Facility: CLINIC | Age: 74
End: 2024-04-09
Payer: MEDICARE

## 2024-04-09 VITALS
RESPIRATION RATE: 17 BRPM | WEIGHT: 169 LBS | HEART RATE: 62 BPM | OXYGEN SATURATION: 99 % | DIASTOLIC BLOOD PRESSURE: 79 MMHG | HEIGHT: 65 IN | BODY MASS INDEX: 28.16 KG/M2 | SYSTOLIC BLOOD PRESSURE: 160 MMHG

## 2024-04-09 PROCEDURE — 99214 OFFICE O/P EST MOD 30 MIN: CPT

## 2024-04-09 NOTE — ASSESSMENT
[FreeTextEntry1] : Arti Is doing well. She will follow-up with us in 6 months and continue close follow-up with Dr. Alvarez.   All medical entries were at my, Dr. Aldo Sainz, direction.  I have reviewed the chart and agree that the record accurately reflects my personal performance of the history, physical exam, assessment and plan.  Our office nurse practitioner was present for the duration of the office visit.

## 2024-04-09 NOTE — HISTORY OF PRESENT ILLNESS
[de-identified] : Ms. ARTI BALDERRAMA is a 74 year old woman here today for a follow up visit. Referred by her PCP Dr. Jaquan Ambrose   She completed a bilateral screening mammogram on 3/30/22 which revealed suggestion of spiculated masses/architectural distortion in the outer central right breast.  Subcentimeter masses are seen in the lower central and lower inner right breast 4-5 cmfn.  Questionable architectural distortion in the central left breast 6 cmfn seen on CC view.  Diagnostic mammogram/sonogram is recommended as well as biopsy of the spiculated masses/architectural distortion in the outer central right breast (BIRADS 4).  Subsequent bilateral diagnostic mammogram and sonogram was performed on 5/3/22. 4 masses are seen in the right breast at 9:00, 12-14 cmfn are highly suggestive of malignancy.  Ultrasound-guided core biopsy of one of the masses is recommended (BIRADS 5).   Biopsy was performed on 22.  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  Her past medical history includes hypertension (on olmesartan), hypothyroid (on synthroid). Past surgical history of 1 . Arti denies any personal or family history of breast cancer, she does have a sister with Ovarian cancer at age 54. She reports daily caffeine intake with 1 cup of tea, denies any history of alcohol or tobacco use.   2022: She denies palpable breast masses, nipple discharge, skin changes, inversion or breast pain. We discussed the need for staging breast MRI, and CT scans of the chest, abdomen, and pelvis.  Arti will follow-up with me upon completion of the imaging to discuss treatment options including breast conservation versus mastectomy.  She may also need a medical oncology evaluation pending the final results of her receptor studies.  [facility] pathology of biopsy slides: moderately differentiated invasive lobular carcinoma, (ER-/[place]-/HER2-)  CT C/A/P from 2022: Multiple small retroperitoneal lymph nodes, the largest of which are considered mildly enlarged. This is nonspecific and may be chronic or reactive. Metastatic disease from breast carcinoma would be considered much less likely although clinical correlation is recommended. PET/CT may be of value if clinically warranted.  Bilateral MR of breasts on 2022: Conglomerate grouping of small irregular masses in the posterior lower outer right breast spanning 3.3 cm, corresponding to a site of biopsy-proven malignancy with contiguous enhancing foci and branch nonmass enhancement extending an additional 5.5 cm anterior to the conglomerate masses, concerning for additional disease. If breast conservation is considered, MRI guided biopsy of a more anterior aspect of this nonmass enhancement is suggested.  0.7 cm ovoid enhancing mass in the lower outer right breast anterior depth, approximately 3.5 cm posterior to the nipple which is indeterminate. If breast conservation is considered, MRI guided biopsy is recommended. No suspicious enhancing findings in the left breast. No lymphadenopathy.  2022: Arti returns today to discuss her recent imaging in details and discuss next steps.   She consulted with Dr. Marina Alvarez from medical oncology on 22 who recommended neoadjuvant chemotherapy (ddAC-T).    PET/CT 22- minimally FDG-avid subcentimeter soft tissue densities in the right breast.  Few small mildly FDG-avid lymph nodes in bilateral axillary and retroperitoneal regions and in the retroperitoneum, not significantly changed in size of number compared to CT dated 22, and nonspecific.  As lobular breast carcinoma typically has low metabolic activity, further characterization may be performed with FES/PET/CT ([place]).  Axillary/retropectoral lymph nodes may be further evaluated with ultrasound and percutaneous needle biopsy as indicated.   Pelvis US 22- small fluid within the endometrial cavity which likely accounts for most of the apparent thickening of th endometrium on CT.  Possible intramural/submucosal leiomyoma with no corresponding abnormality on PET.  FOllow up endovaginal pelvic sonography in 6 months to one year can be performed.    She completed cycle #1 AC/pembrolizumab on 22.  She completed her last dose of paclitazel on 3/16/23.   4/10/23- Arti doing well over all. She completed chemo therapy and will undergo repeat imaging. We discussed the options of breast conservation and mastectomy.  Arti wishes to undergo a mastectomy given the initial multifocal disease.  She is also considering a prophylactic left mastectomy as well.  She is deferring breast reconstruction.  We are arranging re-staging imaging.  Arti will also get genetic testing.    Genetics 2023 with negative findings   B/L diagnostic mammo/sono 2023- No mammographic or sonographic evidence of malignancy in either breast at this time. The previous abnormal findings in the posterior central outer right breast on mammography and 9:00 right breast on sonography are not seen at this time. BI-RADS 6  CT C/A/P 2023 - No evidence of metastatic disease. Small pericardial effusion is again appreciated  Breast MRI 2023- Near complete imaging response post neoadjuvant chemotherapy, as described above. Few residual foci at the site of known index malignancy in the posterior lower outer right breast and extending anteriorly from the site of known second site of malignancy in the lower outer right breast. Appropriate clinical, surgical and oncological management of the known right breast malignancies are recommended. Previously reported enhancing mass in the lower outer anterior right breast is no longer seen. Reportedly, this was biopsied on 2022 revealing intraductal papilloma. The susceptibility artifact from biopsy marker is seen in the lower slightly inner right breast. Surgical consultation for the known right breast papilloma.  2023- Arti returns to discuss surgical planning, she is tentatively scheduled for B/L mastectomies next week on 2023. We discussed the risks, benefits and alternatives of the bilateral mastectomies with sentinel lymph node dissections we also discussed post operative expectations and possible complications.  Arti expresses understanding and agrees to proceed.  **SURGERY** Arti is s/p B/L mastectomies & B/L SLNB on 2023, path: left breast with benign path and 0/2 LN involved. Right breast w/ residual multifocal moderately differentiated invasive lobular carcinoma, 6 mm, LCIS, no LVI seen, margins negative, 0/7 LN involved, ER-/[place]-/HER2-, ypT1b pN0.   Arti saw Dr. Alvarez on 23 and consented to adjuvant Capecitabine.  23- 6 week follow up.  Arti has started Capecitabine and is tolerating it well thus far.  Her incisions are healing well with no evidence of infection or hematoma.  She would like to start applying topical scar creams and is interested in mastectomy bras and prosthetics.  10/23/2023- Arti is feeling well.  She continues Capecitabine and is tolerating it well.  She will follow up with Dr. Alvarez later this afternoon.  She is not interested in breast reconstruction at this point.  She denies any palpable chest wall masses or constitutional symptoms.  She has been undergoing PT which has helped improve her upper extremity ROM.   2024- Arti returns for routine follow up.  She completed Capecitabine 2023 and continues to follow up with Dr. Alvarez.

## 2024-04-09 NOTE — CONSULT LETTER
[FreeTextEntry2] : Jaquan Ambrose MD [FreeTextEntry3] : Aldo Sainz MD\par  Surgical Oncology\par  [facility]\par  Office: [phone]\par  Cell: [phone]\par

## 2024-04-09 NOTE — PHYSICAL EXAM
[FreeTextEntry1] : SS present during physical exam.  [de-identified] : . bilateral mastectomies without reconstruction.

## 2024-05-02 ENCOUNTER — OUTPATIENT (OUTPATIENT)
Dept: OUTPATIENT SERVICES | Facility: HOSPITAL | Age: 74
LOS: 1 days | Discharge: HOME | End: 2024-05-02

## 2024-05-02 DIAGNOSIS — C50.919 MALIGNANT NEOPLASM OF UNSPECIFIED SITE OF UNSPECIFIED FEMALE BREAST: ICD-10-CM

## 2024-05-02 DIAGNOSIS — Z98.891 HISTORY OF UTERINE SCAR FROM PREVIOUS SURGERY: Chronic | ICD-10-CM

## 2024-05-02 DIAGNOSIS — Z98.890 OTHER SPECIFIED POSTPROCEDURAL STATES: Chronic | ICD-10-CM

## 2024-05-12 ENCOUNTER — NON-APPOINTMENT (OUTPATIENT)
Age: 74
End: 2024-05-12

## 2024-05-13 ENCOUNTER — RESULT REVIEW (OUTPATIENT)
Age: 74
End: 2024-05-13

## 2024-05-13 ENCOUNTER — APPOINTMENT (OUTPATIENT)
Dept: HEMATOLOGY ONCOLOGY | Facility: CLINIC | Age: 74
End: 2024-05-13
Payer: MEDICARE

## 2024-05-13 VITALS
TEMPERATURE: 97.5 F | DIASTOLIC BLOOD PRESSURE: 87 MMHG | RESPIRATION RATE: 16 BRPM | SYSTOLIC BLOOD PRESSURE: 158 MMHG | BODY MASS INDEX: 27.15 KG/M2 | HEART RATE: 57 BPM | OXYGEN SATURATION: 98 % | WEIGHT: 163.14 LBS

## 2024-05-13 DIAGNOSIS — C50.919 MALIGNANT NEOPLASM OF UNSPECIFIED SITE OF UNSPECIFIED FEMALE BREAST: ICD-10-CM

## 2024-05-13 LAB
25(OH)D3 SERPL-MCNC: 31 NG/ML
ALBUMIN SERPL ELPH-MCNC: 4.3 G/DL
ALP BLD-CCNC: 99 U/L
ALT SERPL-CCNC: 17 U/L
ANION GAP SERPL CALC-SCNC: 11 MMOL/L
AST SERPL-CCNC: 24 U/L
BASOPHILS # BLD AUTO: 0.02 K/UL — SIGNIFICANT CHANGE UP (ref 0–0.2)
BASOPHILS NFR BLD AUTO: 0.5 % — SIGNIFICANT CHANGE UP (ref 0–2)
BILIRUB SERPL-MCNC: 0.4 MG/DL
BUN SERPL-MCNC: 25 MG/DL
CALCIUM SERPL-MCNC: 9.9 MG/DL
CHLORIDE SERPL-SCNC: 101 MMOL/L
CO2 SERPL-SCNC: 28 MMOL/L
CREAT SERPL-MCNC: 1.27 MG/DL
EGFR: 44 ML/MIN/1.73M2
EOSINOPHIL # BLD AUTO: 0.14 K/UL — SIGNIFICANT CHANGE UP (ref 0–0.5)
EOSINOPHIL NFR BLD AUTO: 3.5 % — SIGNIFICANT CHANGE UP (ref 0–6)
GLUCOSE SERPL-MCNC: 83 MG/DL
HCT VFR BLD CALC: 33.6 % — LOW (ref 34.5–45)
HGB BLD-MCNC: 11.2 G/DL — LOW (ref 11.5–15.5)
IMM GRANULOCYTES NFR BLD AUTO: 0.3 % — SIGNIFICANT CHANGE UP (ref 0–0.9)
LYMPHOCYTES # BLD AUTO: 1.66 K/UL — SIGNIFICANT CHANGE UP (ref 1–3.3)
LYMPHOCYTES # BLD AUTO: 41.9 % — SIGNIFICANT CHANGE UP (ref 13–44)
MCHC RBC-ENTMCNC: 32.8 PG — SIGNIFICANT CHANGE UP (ref 27–34)
MCHC RBC-ENTMCNC: 33.3 G/DL — SIGNIFICANT CHANGE UP (ref 32–36)
MCV RBC AUTO: 98.5 FL — SIGNIFICANT CHANGE UP (ref 80–100)
MONOCYTES # BLD AUTO: 0.61 K/UL — SIGNIFICANT CHANGE UP (ref 0–0.9)
MONOCYTES NFR BLD AUTO: 15.4 % — HIGH (ref 2–14)
NEUTROPHILS # BLD AUTO: 1.52 K/UL — LOW (ref 1.8–7.4)
NEUTROPHILS NFR BLD AUTO: 38.4 % — LOW (ref 43–77)
NRBC # BLD: 0 /100 WBCS — SIGNIFICANT CHANGE UP (ref 0–0)
PLATELET # BLD AUTO: 138 K/UL — LOW (ref 150–400)
POTASSIUM SERPL-SCNC: 4.8 MMOL/L
PROT SERPL-MCNC: 8.1 G/DL
RBC # BLD: 3.41 M/UL — LOW (ref 3.8–5.2)
RBC # FLD: 14.6 % — HIGH (ref 10.3–14.5)
SODIUM SERPL-SCNC: 140 MMOL/L
WBC # BLD: 3.96 K/UL — SIGNIFICANT CHANGE UP (ref 3.8–10.5)
WBC # FLD AUTO: 3.96 K/UL — SIGNIFICANT CHANGE UP (ref 3.8–10.5)

## 2024-05-13 PROCEDURE — 99214 OFFICE O/P EST MOD 30 MIN: CPT

## 2024-05-13 NOTE — PHYSICAL EXAM
[Restricted in physically strenuous activity but ambulatory and able to carry out work of a light or sedentary nature] : Status 1- Restricted in physically strenuous activity but ambulatory and able to carry out work of a light or sedentary nature, e.g., light house work, office work [Thrush] : thrush [Normal] : normal appearance, no rash, nodules, vesicles, ulcers, erythema [de-identified] : bilateral mastectomy, no reconstruction

## 2024-05-13 NOTE — HISTORY OF PRESENT ILLNESS
[Disease: _____________________] : Disease: [unfilled] [de-identified] : 74F,originally from [place], with PMX HTN, hypothyroidism, s/p , referred for medical oncology consultation of right breast, ER/CA/HER2 negative mixed poorly differentiated ductal/lobular carcinoma.  CASE SYNOPSIS: 3/30/22: Bilateral screening mammogram-spiculated masses/architectural distortion in the outer central right breast.  Subcentimeter masses seen in the lower central and lower inner right breast N4-5 cm.  Questionable architectural distortion in the central left breast, N 6 cm seen on cc view.  5/3/2022 diagnostic mammography and breast ultrasound; 4 multiple spiculated masses seen in the right breast at 9:00, N 12-14 cm, highly suggestive of malignancy, for which US guided core biopsy recommended.  2022-US core biopsy right breast mass; pathology consistent with invasive, mixed poorly differentiated ductal/lobular carcinoma, ER, CA, HER2 negative.  2022: CT C/A/P-multiple, small retroperitoneal lymph nodes, considered mildly enlarged, nonspecific, chronic/reactive, less likely metastatic disease from breast cancer.  2022: Bilateral breast MRI: Conglomerate grouping of small irregular masses in the posterior lower outer right breast spanning 3.3 cm corresponding to the site of biopsy-proven malignancy with contiguous enhancing foci and branched non-mass enhancement extending an additional 5.5 cm anterior to the conglomerate masses, concerning for additional disease.  MRI guided biopsy of a more anterior aspect of this non-mass enhancement is suggested if breast conservation is considered. 0.7 cm ovoid enhancing mass in the lower outer right breast anterior depth, approximately 3.5 cm posterior to the nipple, indeterminate.  MRI guided biopsy recommended No suspicious enhancing findings in the left breast.  No lymphadenopathy.  2022-MRI guided vacuum-assisted core needle biopsy; pathology: RIGHT Site A (outer lower region): Invasive lobular carcinoma, classic type, Goldston score 6/9, invasive tumor present in several small foci, all measuring < 0.1 cm, LCIS classic type, Site B (anterior outer lower region): Intraductal papilloma with adjacent foci of columnar cell change, apocrine metaplasia, microcyst formation and simple adenosis.  2022: PET scan-preliminary report negative  7/15/06-Auhbkt-o-Port placement left chest wall  22- cycle #1 AC/ pembrolizumab  10/20/22- 3/16/23 completes paclitaxel / pembrolizumab  2023- [facility]- negative.  2023 bilateral modified radical mastectomy; surgical pathology: RIGHT: Multifocal residual invasive moderately differentiated lobular carcinoma, 6 mm, located more than 2 mm from margins, no LVI, + classic LCIS, 0/3 SLN involvement; the overall invasive carcinoma cellularity<10%, ER/CA negative, HER2 negative, concordant (pT1b, pN 0) LEFT: Calcified fibroadenoma; 0/2 SLN involvement  - 2023-adjuvant capecitabine 1500 mg BID x 6 cycles  [de-identified] : Moderately differentiated invasive lobular carcinoma [de-identified] : ER negative, ND negative, HER2 negative [FreeTextEntry1] : Neoadjuvant systemic chemotherapy [de-identified] : Patient presents today for routine follow-up.  Endorses no new complaints today.  Physical examination unchanged.  Denies fever, night sweats pain or shortness of breath.  Last seen in office 3 months ago; in interim patient followed up with Dr. Sainz.  Completed capecitabine in November 2023 and reports no new constitutional symptoms. Continues to present  some residual paresthesia, minimal.  Appetite back to normal. Ms. BALDERRAMA reports no hospitalization and no changes in medication.  Laboratory results stable.

## 2024-05-13 NOTE — ASSESSMENT
[FreeTextEntry1] : Ms. BALDERRAMA 's questions were answered to her satisfaction.  She  expressed her  understanding and willingness to comply with the above recommendations, and  will return to the office in 6 months.

## 2024-05-13 NOTE — REVIEW OF SYSTEMS
[Fatigue] : fatigue [Recent Change In Weight] : ~T recent weight change [Constipation] : constipation [Negative] : Psychiatric

## 2024-07-19 ENCOUNTER — OUTPATIENT (OUTPATIENT)
Dept: OUTPATIENT SERVICES | Facility: HOSPITAL | Age: 74
LOS: 1 days | Discharge: HOME | End: 2024-07-19

## 2024-07-19 DIAGNOSIS — Z98.890 OTHER SPECIFIED POSTPROCEDURAL STATES: Chronic | ICD-10-CM

## 2024-07-19 DIAGNOSIS — C50.919 MALIGNANT NEOPLASM OF UNSPECIFIED SITE OF UNSPECIFIED FEMALE BREAST: ICD-10-CM

## 2024-07-19 DIAGNOSIS — Z98.891 HISTORY OF UTERINE SCAR FROM PREVIOUS SURGERY: Chronic | ICD-10-CM

## 2024-07-22 ENCOUNTER — APPOINTMENT (OUTPATIENT)
Dept: INFUSION THERAPY | Facility: HOSPITAL | Age: 74
End: 2024-07-22

## 2024-09-20 ENCOUNTER — OUTPATIENT (OUTPATIENT)
Dept: OUTPATIENT SERVICES | Facility: HOSPITAL | Age: 74
LOS: 1 days | Discharge: HOME | End: 2024-09-20

## 2024-09-20 DIAGNOSIS — C50.919 MALIGNANT NEOPLASM OF UNSPECIFIED SITE OF UNSPECIFIED FEMALE BREAST: ICD-10-CM

## 2024-09-20 DIAGNOSIS — Z98.891 HISTORY OF UTERINE SCAR FROM PREVIOUS SURGERY: Chronic | ICD-10-CM

## 2024-09-20 DIAGNOSIS — Z98.890 OTHER SPECIFIED POSTPROCEDURAL STATES: Chronic | ICD-10-CM

## 2024-09-30 ENCOUNTER — RESULT REVIEW (OUTPATIENT)
Age: 74
End: 2024-09-30

## 2024-09-30 ENCOUNTER — APPOINTMENT (OUTPATIENT)
Dept: HEMATOLOGY ONCOLOGY | Facility: CLINIC | Age: 74
End: 2024-09-30
Payer: MEDICARE

## 2024-09-30 VITALS
BODY MASS INDEX: 28.19 KG/M2 | RESPIRATION RATE: 17 BRPM | HEART RATE: 65 BPM | DIASTOLIC BLOOD PRESSURE: 73 MMHG | OXYGEN SATURATION: 98 % | HEIGHT: 65 IN | TEMPERATURE: 98.5 F | SYSTOLIC BLOOD PRESSURE: 157 MMHG | WEIGHT: 169.18 LBS

## 2024-09-30 DIAGNOSIS — C50.919 MALIGNANT NEOPLASM OF UNSPECIFIED SITE OF UNSPECIFIED FEMALE BREAST: ICD-10-CM

## 2024-09-30 LAB
25(OH)D3 SERPL-MCNC: 28.1 NG/ML
ALBUMIN SERPL ELPH-MCNC: 4.4 G/DL
ALP BLD-CCNC: 110 U/L
ALT SERPL-CCNC: 15 U/L
ANION GAP SERPL CALC-SCNC: 11 MMOL/L
AST SERPL-CCNC: 20 U/L
BASOPHILS # BLD AUTO: 0.01 K/UL — SIGNIFICANT CHANGE UP (ref 0–0.2)
BASOPHILS NFR BLD AUTO: 0.2 % — SIGNIFICANT CHANGE UP (ref 0–2)
BILIRUB SERPL-MCNC: 0.3 MG/DL
BUN SERPL-MCNC: 23 MG/DL
CALCIUM SERPL-MCNC: 10 MG/DL
CHLORIDE SERPL-SCNC: 102 MMOL/L
CO2 SERPL-SCNC: 28 MMOL/L
CREAT SERPL-MCNC: 1.2 MG/DL
EGFR: 47 ML/MIN/1.73M2
EOSINOPHIL # BLD AUTO: 0.09 K/UL — SIGNIFICANT CHANGE UP (ref 0–0.5)
EOSINOPHIL NFR BLD AUTO: 2.1 % — SIGNIFICANT CHANGE UP (ref 0–6)
GLUCOSE SERPL-MCNC: 82 MG/DL
HCT VFR BLD CALC: 33 % — LOW (ref 34.5–45)
HGB BLD-MCNC: 10.8 G/DL — LOW (ref 11.5–15.5)
IMM GRANULOCYTES NFR BLD AUTO: 0.2 % — SIGNIFICANT CHANGE UP (ref 0–0.9)
LYMPHOCYTES # BLD AUTO: 1.61 K/UL — SIGNIFICANT CHANGE UP (ref 1–3.3)
LYMPHOCYTES # BLD AUTO: 38 % — SIGNIFICANT CHANGE UP (ref 13–44)
MCHC RBC-ENTMCNC: 32.4 PG — SIGNIFICANT CHANGE UP (ref 27–34)
MCHC RBC-ENTMCNC: 32.7 G/DL — SIGNIFICANT CHANGE UP (ref 32–36)
MCV RBC AUTO: 99.1 FL — SIGNIFICANT CHANGE UP (ref 80–100)
MONOCYTES # BLD AUTO: 0.71 K/UL — SIGNIFICANT CHANGE UP (ref 0–0.9)
MONOCYTES NFR BLD AUTO: 16.7 % — HIGH (ref 2–14)
NEUTROPHILS # BLD AUTO: 1.81 K/UL — SIGNIFICANT CHANGE UP (ref 1.8–7.4)
NEUTROPHILS NFR BLD AUTO: 42.8 % — LOW (ref 43–77)
NRBC # BLD: 0 /100 WBCS — SIGNIFICANT CHANGE UP (ref 0–0)
NRBC BLD-RTO: 0 /100 WBCS — SIGNIFICANT CHANGE UP (ref 0–0)
PLATELET # BLD AUTO: 144 K/UL — LOW (ref 150–400)
POTASSIUM SERPL-SCNC: 5 MMOL/L
PROT SERPL-MCNC: 8 G/DL
RBC # BLD: 3.33 M/UL — LOW (ref 3.8–5.2)
RBC # FLD: 14.4 % — SIGNIFICANT CHANGE UP (ref 10.3–14.5)
SODIUM SERPL-SCNC: 141 MMOL/L
WBC # BLD: 4.24 K/UL — SIGNIFICANT CHANGE UP (ref 3.8–10.5)
WBC # FLD AUTO: 4.24 K/UL — SIGNIFICANT CHANGE UP (ref 3.8–10.5)

## 2024-09-30 PROCEDURE — 99214 OFFICE O/P EST MOD 30 MIN: CPT

## 2024-09-30 NOTE — HISTORY OF PRESENT ILLNESS
[Disease: _____________________] : Disease: [unfilled] [de-identified] : 74F,originally from [place], with PMX HTN, hypothyroidism, s/p , referred for medical oncology consultation of right breast, ER/OK/HER2 negative mixed poorly differentiated ductal/lobular carcinoma.  CASE SYNOPSIS: 3/30/22: Bilateral screening mammogram-spiculated masses/architectural distortion in the outer central right breast.  Subcentimeter masses seen in the lower central and lower inner right breast N4-5 cm.  Questionable architectural distortion in the central left breast, N 6 cm seen on cc view.  5/3/2022 diagnostic mammography and breast ultrasound; 4 multiple spiculated masses seen in the right breast at 9:00, N 12-14 cm, highly suggestive of malignancy, for which US guided core biopsy recommended.  2022-US core biopsy right breast mass; pathology consistent with invasive, mixed poorly differentiated ductal/lobular carcinoma, ER, [place], HER2 negative.  2022: CT C/A/P-multiple, small retroperitoneal lymph nodes, considered mildly enlarged, nonspecific, chronic/reactive, less likely metastatic disease from breast cancer.  2022: Bilateral breast MRI: Conglomerate grouping of small irregular masses in the posterior lower outer right breast spanning 3.3 cm corresponding to the site of biopsy-proven malignancy with contiguous enhancing foci and branched non-mass enhancement extending an additional 5.5 cm anterior to the conglomerate masses, concerning for additional disease.  MRI guided biopsy of a more anterior aspect of this non-mass enhancement is suggested if breast conservation is considered. 0.7 cm ovoid enhancing mass in the lower outer right breast anterior depth, approximately 3.5 cm posterior to the nipple, indeterminate.  MRI guided biopsy recommended No suspicious enhancing findings in the left breast.  No lymphadenopathy.  2022-MRI guided vacuum-assisted core needle biopsy; pathology: RIGHT Site A (outer lower region): Invasive lobular carcinoma, classic type, Jenkins score 6/9, invasive tumor present in several small foci, all measuring < 0.1 cm, LCIS classic type, Site B (anterior outer lower region): Intraductal papilloma with adjacent foci of columnar cell change, apocrine metaplasia, microcyst formation and simple adenosis.  2022: PET scan-preliminary report negative  7/15/58-Czdsjc-s-Port placement left chest wall  22- cycle #1 AC/ pembrolizumab  10/20/22- 3/16/23 completes paclitaxel / pembrolizumab  2023- [facility]- negative.  2023 bilateral modified radical mastectomy; surgical pathology: RIGHT: Multifocal residual invasive moderately differentiated lobular carcinoma, 6 mm, located more than 2 mm from margins, no LVI, + classic LCIS, 0/3 SLN involvement; the overall invasive carcinoma cellularity<10%, ER/OK negative, HER2 negative, concordant (pT1b, pN 0) LEFT: Calcified fibroadenoma; 0/2 SLN involvement  - 2023-adjuvant capecitabine 1500 mg BID x 6 cycles  [de-identified] : Moderately differentiated invasive lobular carcinoma [de-identified] : ER negative, OR negative, HER2 negative [FreeTextEntry1] : Neoadjuvant systemic chemotherapy [de-identified] : Reporting no changes in clinical status and no new symptoms since her last visit in May 2024 Physical examination unchanged from previous examination. Medication list reviewed, including levothyroxine, olmesartan and fluticasone; discontinued antiemetics. Hematologic picture consistent with mild anemia and thrombocytopenia. Reports no recent imaging.  Patient planning a trip to [place] around [facility]    Patient presents today for routine follow-up.  Endorses no new complaints today.  Physical examination unchanged.  Denies fever, night sweats pain or shortness of breath.  Last seen in office 3 months ago; in interim patient followed up with Dr. Sainz.  Completed capecitabine in November 2023 and reports no new constitutional symptoms. Continues to present  some residual paresthesia, minimal.  Appetite back to normal. Ms. BALDERRAMA reports no hospitalization and no changes in medication.  Laboratory results stable.

## 2024-09-30 NOTE — PHYSICAL EXAM
[Restricted in physically strenuous activity but ambulatory and able to carry out work of a light or sedentary nature] : Status 1- Restricted in physically strenuous activity but ambulatory and able to carry out work of a light or sedentary nature, e.g., light house work, office work [Thrush] : thrush [Normal] : normal appearance, no rash, nodules, vesicles, ulcers, erythema [de-identified] : bilateral mastectomy, no reconstruction

## 2024-09-30 NOTE — PHYSICAL EXAM
[Restricted in physically strenuous activity but ambulatory and able to carry out work of a light or sedentary nature] : Status 1- Restricted in physically strenuous activity but ambulatory and able to carry out work of a light or sedentary nature, e.g., light house work, office work [Thrush] : thrush [Normal] : normal appearance, no rash, nodules, vesicles, ulcers, erythema [de-identified] : bilateral mastectomy, no reconstruction

## 2024-09-30 NOTE — HISTORY OF PRESENT ILLNESS
[Disease: _____________________] : Disease: [unfilled] [de-identified] : 74F,originally from [place], with PMX HTN, hypothyroidism, s/p , referred for medical oncology consultation of right breast, ER/ND/HER2 negative mixed poorly differentiated ductal/lobular carcinoma.  CASE SYNOPSIS: 3/30/22: Bilateral screening mammogram-spiculated masses/architectural distortion in the outer central right breast.  Subcentimeter masses seen in the lower central and lower inner right breast N4-5 cm.  Questionable architectural distortion in the central left breast, N 6 cm seen on cc view.  5/3/2022 diagnostic mammography and breast ultrasound; 4 multiple spiculated masses seen in the right breast at 9:00, N 12-14 cm, highly suggestive of malignancy, for which US guided core biopsy recommended.  2022-US core biopsy right breast mass; pathology consistent with invasive, mixed poorly differentiated ductal/lobular carcinoma, ER, ND, HER2 negative.  2022: CT C/A/P-multiple, small retroperitoneal lymph nodes, considered mildly enlarged, nonspecific, chronic/reactive, less likely metastatic disease from breast cancer.  2022: Bilateral breast MRI: Conglomerate grouping of small irregular masses in the posterior lower outer right breast spanning 3.3 cm corresponding to the site of biopsy-proven malignancy with contiguous enhancing foci and branched non-mass enhancement extending an additional 5.5 cm anterior to the conglomerate masses, concerning for additional disease.  MRI guided biopsy of a more anterior aspect of this non-mass enhancement is suggested if breast conservation is considered. 0.7 cm ovoid enhancing mass in the lower outer right breast anterior depth, approximately 3.5 cm posterior to the nipple, indeterminate.  MRI guided biopsy recommended No suspicious enhancing findings in the left breast.  No lymphadenopathy.  2022-MRI guided vacuum-assisted core needle biopsy; pathology: RIGHT Site A (outer lower region): Invasive lobular carcinoma, classic type, Humphreys score 6/9, invasive tumor present in several small foci, all measuring < 0.1 cm, LCIS classic type, Site B (anterior outer lower region): Intraductal papilloma with adjacent foci of columnar cell change, apocrine metaplasia, microcyst formation and simple adenosis.  2022: PET scan-preliminary report negative  7/15/64-Ntedyi-s-Port placement left chest wall  22- cycle #1 AC/ pembrolizumab  10/20/22- 3/16/23 completes paclitaxel / pembrolizumab  2023- [facility]- negative.  2023 bilateral modified radical mastectomy; surgical pathology: RIGHT: Multifocal residual invasive moderately differentiated lobular carcinoma, 6 mm, located more than 2 mm from margins, no LVI, + classic LCIS, 0/3 SLN involvement; the overall invasive carcinoma cellularity<10%, ER/ND negative, HER2 negative, concordant (pT1b, pN 0) LEFT: Calcified fibroadenoma; 0/2 SLN involvement  - 2023-adjuvant capecitabine 1500 mg BID x 6 cycles  [de-identified] : Moderately differentiated invasive lobular carcinoma [de-identified] : ER negative, NE negative, HER2 negative [FreeTextEntry1] : Neoadjuvant systemic chemotherapy [de-identified] : Reporting no changes in clinical status and no new symptoms since her last visit in May 2024 Physical examination unchanged from previous examination. Medication list reviewed, including levothyroxine, olmesartan and fluticasone; discontinued antiemetics. Hematologic picture consistent with mild anemia and thrombocytopenia. Reports no recent imaging.  Patient planning a trip to [place] around [facility]    Patient presents today for routine follow-up.  Endorses no new complaints today.  Physical examination unchanged.  Denies fever, night sweats pain or shortness of breath.  Last seen in office 3 months ago; in interim patient followed up with Dr. Sainz.  Completed capecitabine in November 2023 and reports no new constitutional symptoms. Continues to present  some residual paresthesia, minimal.  Appetite back to normal. Ms. BALDERRAMA reports no hospitalization and no changes in medication.  Laboratory results stable.

## 2024-09-30 NOTE — HISTORY OF PRESENT ILLNESS
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  CASE SYNOPSIS: 3/30/22: Bilateral screening mammogram-spiculated masses/architectural distortion in the outer central right breast.  Subcentimeter masses seen in the lower central and lower inner right breast N4-5 cm.  Questionable architectural distortion in the central left breast, N 6 cm seen on cc view.  5/3/2022 diagnostic mammography and breast ultrasound; 4 multiple spiculated masses seen in the right breast at 9:00, N 12-14 cm, highly suggestive of malignancy, for which US guided core biopsy recommended.  2022-US core biopsy right breast mass; pathology consistent with invasive, mixed poorly differentiated ductal/lobular carcinoma, ER, TX, HER2 negative.  2022: CT C/A/P-multiple, small retroperitoneal lymph nodes, considered mildly enlarged, nonspecific, chronic/reactive, less likely metastatic disease from breast cancer.  2022: Bilateral breast MRI: Conglomerate grouping of small irregular masses in the posterior lower outer right breast spanning 3.3 cm corresponding to the site of biopsy-proven malignancy with contiguous enhancing foci and branched non-mass enhancement extending an additional 5.5 cm anterior to the conglomerate masses, concerning for additional disease.  MRI guided biopsy of a more anterior aspect of this non-mass enhancement is suggested if breast conservation is considered. 0.7 cm ovoid enhancing mass in the lower outer right breast anterior depth, approximately 3.5 cm posterior to the nipple, indeterminate.  MRI guided biopsy recommended No suspicious enhancing findings in the left breast.  No lymphadenopathy.  2022-MRI guided vacuum-assisted core needle biopsy; pathology: RIGHT Site A (outer lower region): Invasive lobular carcinoma, classic type, Perkins score 6/9, invasive tumor present in several small foci, all measuring < 0.1 cm, LCIS classic type, Site B (anterior outer lower region): Intraductal papilloma with adjacent foci of columnar cell change, apocrine metaplasia, microcyst formation and simple adenosis.  2022: PET scan-preliminary report negative  7/15/91-Ixyevo-i-Port placement left chest wall  22- cycle #1 AC/ pembrolizumab  10/20/22- 3/16/23 completes paclitaxel / pembrolizumab  2023- [facility]- negative.  2023 bilateral modified radical mastectomy; surgical pathology: RIGHT: Multifocal residual invasive moderately differentiated lobular carcinoma, 6 mm, located more than 2 mm from margins, no LVI, + classic LCIS, 0/3 SLN involvement; the overall invasive carcinoma cellularity<10%, ER/TX negative, HER2 negative, concordant (pT1b, pN 0) LEFT: Calcified fibroadenoma; 0/2 SLN involvement  - 2023-adjuvant capecitabine 1500 mg BID x 6 cycles  [de-identified] : Moderately differentiated invasive lobular carcinoma [de-identified] : ER negative, NJ negative, HER2 negative [[email or web address]] : Neoadjuvant systemic chemotherapy [de-identified] : Reporting no changes in clinical status and no new symptoms since her last visit in May 2024 Physical examination unchanged from previous examination. Medication list reviewed, including levothyroxine, olmesartan and fluticasone; discontinued antiemetics. Hematologic picture consistent with mild anemia and thrombocytopenia. Reports no recent imaging.  Patient planning a trip to [place] around [facility]    Patient presents today for routine follow-up.  Endorses no new complaints today.  Physical examination unchanged.  Denies fever, night sweats pain or shortness of breath.  Last seen in office 3 months ago; in interim patient followed up with Dr. Sainz.  Completed capecitabine in November 2023 and reports no new constitutional symptoms. Continues to present  some residual paresthesia, minimal.  Appetite back to normal. Ms. BALDERRAMA reports no hospitalization and no changes in medication.  Laboratory results stable.

## 2024-09-30 NOTE — PHYSICAL EXAM
[Restricted in physically strenuous activity but ambulatory and able to carry out work of a light or sedentary nature] : Status 1- Restricted in physically strenuous activity but ambulatory and able to carry out work of a light or sedentary nature, e.g., light house work, office work [Thrush] : thrush [Normal] : normal appearance, no rash, nodules, vesicles, ulcers, erythema [de-identified] : bilateral mastectomy, no reconstruction

## 2024-09-30 NOTE — BEGINNING OF VISIT
[0] : 2) Feeling down, depressed, or hopeless: Not at all (0) [PHQ-2 Negative] : PHQ-2 Negative [Pain Scale: ___] : On a scale of 1-10, today the patient's pain is a(n) [unfilled]. [Never] : Never [Patient/Caregiver unclear of wishes] : Patient/Caregiver unclear of wishes

## 2024-10-14 ENCOUNTER — NON-APPOINTMENT (OUTPATIENT)
Age: 74
End: 2024-10-14

## 2024-10-14 ENCOUNTER — APPOINTMENT (OUTPATIENT)
Dept: SURGICAL ONCOLOGY | Facility: CLINIC | Age: 74
End: 2024-10-14
Payer: MEDICARE

## 2024-10-14 VITALS
SYSTOLIC BLOOD PRESSURE: 138 MMHG | HEIGHT: 65 IN | WEIGHT: 167.5 LBS | RESPIRATION RATE: 17 BRPM | BODY MASS INDEX: 27.91 KG/M2 | OXYGEN SATURATION: 98 % | HEART RATE: 76 BPM | DIASTOLIC BLOOD PRESSURE: 75 MMHG

## 2024-10-14 PROCEDURE — 99214 OFFICE O/P EST MOD 30 MIN: CPT

## 2024-10-17 ENCOUNTER — APPOINTMENT (OUTPATIENT)
Dept: ENDOVASCULAR SURGERY | Facility: CLINIC | Age: 74
End: 2024-10-17
Payer: MEDICARE

## 2024-10-17 ENCOUNTER — RESULT REVIEW (OUTPATIENT)
Age: 74
End: 2024-10-17

## 2024-10-17 VITALS
SYSTOLIC BLOOD PRESSURE: 195 MMHG | BODY MASS INDEX: 27.82 KG/M2 | OXYGEN SATURATION: 100 % | DIASTOLIC BLOOD PRESSURE: 84 MMHG | RESPIRATION RATE: 16 BRPM | WEIGHT: 167 LBS | TEMPERATURE: 98.4 F | HEART RATE: 74 BPM | HEIGHT: 65 IN

## 2024-10-17 DIAGNOSIS — C50.911 MALIGNANT NEOPLASM OF UNSPECIFIED SITE OF RIGHT FEMALE BREAST: ICD-10-CM

## 2024-10-17 PROCEDURE — 36590 REMOVAL TUNNELED CV CATH: CPT | Mod: LT

## 2024-10-17 PROCEDURE — 77001 FLUOROGUIDE FOR VEIN DEVICE: CPT

## 2024-11-18 NOTE — PATIENT PROFILE ADULT - NSPRONUTRITIONRISK_GEN_A_NUR
Other (Free Text): 5/28/21 used efudex solution 5% on scalp x 10d - has some crusting\\n4/29/22 used 5FU/calcipotriene on entire face x 4d - good reaction\\n10/21/22 used efudex solution 5% on scalp BID x 12d (good reaction) and used 5FU/calcipotriene on forehead x 4d (good reaction)\\n2/22/23 used efudex solution 5% on scalp (good reaction) and 5FU/calcipotriene on entire face (not just forehead) (good reaction)\\n8/14/23 used 5FU/calcipotriene on scalp (or 5FU solution) and on entire face - mild to moderate reaction\\n11/13/23 used 5FU/calcipotriene on face (mild reaction) and scalp (mild to mod reaction)\\n2/12/24 used 5FU/calcipotriene on forehead and scalp (and ears) - moderate reaction\\n5/13/24 used 5FU/calcipotriene on forehead and cheeks (mild reaction) and 5FU solution on scalp (good reaction)\\n8/19/24 used 5FU/calcipotriene on forehead and cheeks (good reaction) and 5FU solution on scalp (good reaction)\\n\\n------\\n\\n8/19/24\\nRepeat 5FU/calcipotriene on forehead and cheeks BID x 4-8\\nRepeat 5FU solution on scalp BID x 14d\\n\\n11/18/2024\\nUsed 5FU/calcipotriene on forehead and cheeks BID x 4d\\nStarted feeling a mild reaction on day 4-5, but was sore at 5 days\\nSo stopped\\nThen treated again x 5 days about 3 weeks ago\\nStill feels a little scaliness\\nCan repeat when needed\\n\\n5FU solution on scalp BID x 14d\\nGood reaction, got sore and scabs\\nFeels better Note Text (......Xxx Chief Complaint.): This diagnosis correlates with the Render Risk Assessment In Note?: no Detail Level: Detailed No indicators present

## 2025-03-12 ENCOUNTER — OUTPATIENT (OUTPATIENT)
Dept: OUTPATIENT SERVICES | Facility: HOSPITAL | Age: 75
LOS: 1 days | Discharge: HOME | End: 2025-03-12

## 2025-03-12 DIAGNOSIS — Z98.890 OTHER SPECIFIED POSTPROCEDURAL STATES: Chronic | ICD-10-CM

## 2025-03-12 DIAGNOSIS — Z98.891 HISTORY OF UTERINE SCAR FROM PREVIOUS SURGERY: Chronic | ICD-10-CM

## 2025-03-14 ENCOUNTER — APPOINTMENT (OUTPATIENT)
Dept: HEMATOLOGY ONCOLOGY | Facility: CLINIC | Age: 75
End: 2025-03-14
Payer: MEDICARE

## 2025-03-14 VITALS
WEIGHT: 171.96 LBS | TEMPERATURE: 96.6 F | DIASTOLIC BLOOD PRESSURE: 76 MMHG | RESPIRATION RATE: 16 BRPM | HEART RATE: 71 BPM | OXYGEN SATURATION: 99 % | BODY MASS INDEX: 28.62 KG/M2 | SYSTOLIC BLOOD PRESSURE: 137 MMHG

## 2025-03-14 DIAGNOSIS — C50.919 MALIGNANT NEOPLASM OF UNSPECIFIED SITE OF UNSPECIFIED FEMALE BREAST: ICD-10-CM

## 2025-03-14 PROCEDURE — 99214 OFFICE O/P EST MOD 30 MIN: CPT

## 2025-05-06 ENCOUNTER — NON-APPOINTMENT (OUTPATIENT)
Age: 75
End: 2025-05-06

## 2025-05-15 ENCOUNTER — APPOINTMENT (OUTPATIENT)
Dept: SURGICAL ONCOLOGY | Facility: CLINIC | Age: 75
End: 2025-05-15
Payer: MEDICARE

## 2025-05-15 ENCOUNTER — NON-APPOINTMENT (OUTPATIENT)
Age: 75
End: 2025-05-15

## 2025-05-15 VITALS
SYSTOLIC BLOOD PRESSURE: 130 MMHG | WEIGHT: 172 LBS | HEIGHT: 65 IN | BODY MASS INDEX: 28.66 KG/M2 | DIASTOLIC BLOOD PRESSURE: 70 MMHG | OXYGEN SATURATION: 97 % | HEART RATE: 128 BPM | RESPIRATION RATE: 17 BRPM

## 2025-05-15 DIAGNOSIS — C50.911 MALIGNANT NEOPLASM OF UNSPECIFIED SITE OF RIGHT FEMALE BREAST: ICD-10-CM

## 2025-05-15 PROCEDURE — 99214 OFFICE O/P EST MOD 30 MIN: CPT

## (undated) DEVICE — DRAIN JACKSON PRATT 10MM FLAT FULL 15FR TROCAR

## (undated) DEVICE — CANISTER DISPOSABLE THIN WALL 3000CC

## (undated) DEVICE — DRSG TEGADERM 4X4.75"

## (undated) DEVICE — SUT VICRYL 3-0 27" SH UNDYED

## (undated) DEVICE — DRAPE COVER SLEEVE GAMMA FINDER III

## (undated) DEVICE — SUT SILK 2-0 18" FS

## (undated) DEVICE — DRSG CURITY GAUZE SPONGE 4 X 4" 12-PLY

## (undated) DEVICE — DRSG XEROFORM 5 X 9"

## (undated) DEVICE — PACK MAJOR ABDOMINAL WITH LAP

## (undated) DEVICE — ELCTR GROUNDING PAD ADULT COVIDIEN

## (undated) DEVICE — DRSG BENZOIN 0.6CC

## (undated) DEVICE — Device

## (undated) DEVICE — ELCTR BOVIE TIP BLADE INSULATED 2.8" EDGE WITH SAFETY

## (undated) DEVICE — SUT VICRYL 2-0 27" SH UNDYED

## (undated) DEVICE — VENODYNE/SCD SLEEVE CALF MEDIUM

## (undated) DEVICE — ELCTR BOVIE TIP BLADE INSULATED 2.75" EDGE

## (undated) DEVICE — DRAPE LAPAROTOMY TRANSVERSE

## (undated) DEVICE — SOL IRR POUR H2O 1500ML

## (undated) DEVICE — SUT QUILL MONODERM 2-0 30CM 19MM

## (undated) DEVICE — SHEATH SURG GUIDE SCOUT DISP STRL

## (undated) DEVICE — ELCTR BOVIE TIP BLADE MEGADYNE E-Z CLEAN 6.5" (LONG)

## (undated) DEVICE — DRAPE INSTRUMENT POUCH 6.75" X 11"

## (undated) DEVICE — DRSG STERISTRIPS 0.5 X 4"

## (undated) DEVICE — BLADE SURGICAL #10 STAINLESS

## (undated) DEVICE — DRAIN RESERVOIR FOR JACKSON PRATT 100CC CARDINAL

## (undated) DEVICE — DRAPE 3/4 SHEET 52X76"

## (undated) DEVICE — SAFETY PIN

## (undated) DEVICE — ELCTR BOVIE PENCIL SMOKE EVACUATION

## (undated) DEVICE — SUT MONOCRYL 4-0 27" PS-2 UNDYED

## (undated) DEVICE — STAPLER SKIN VISI-STAT 35 WIDE

## (undated) DEVICE — DRAPE TOWEL BLUE 17" X 24"

## (undated) DEVICE — WARMING BLANKET FULL ADULT

## (undated) DEVICE — LIGASURE SMALL JAW

## (undated) DEVICE — DRSG TELFA 3 X 8

## (undated) DEVICE — LAP PAD W RING 18 X 18"

## (undated) DEVICE — LABELS BLANK W PEN

## (undated) DEVICE — DRAPE IOBAN 33" X 23"

## (undated) DEVICE — DRSG COMBINE 5X9"

## (undated) DEVICE — BLADE SURGICAL #15 CARBON

## (undated) DEVICE — POSITIONER STRAP ARMBOARD VELCRO TS-30

## (undated) DEVICE — ELCTR BOVIE TIP BLADE INSULATED 6.5" EDGE